# Patient Record
Sex: FEMALE | Race: WHITE | Employment: UNEMPLOYED | URBAN - METROPOLITAN AREA
[De-identification: names, ages, dates, MRNs, and addresses within clinical notes are randomized per-mention and may not be internally consistent; named-entity substitution may affect disease eponyms.]

---

## 2022-03-18 PROBLEM — O26.851 SPOTTING AFFECTING PREGNANCY IN FIRST TRIMESTER: Status: ACTIVE | Noted: 2020-08-26

## 2022-03-19 PROBLEM — R10.2 PELVIC PAIN IN ANTEPARTUM PERIOD IN THIRD TRIMESTER: Status: ACTIVE | Noted: 2021-02-21

## 2022-03-19 PROBLEM — O21.0 HYPEREMESIS AFFECTING PREGNANCY, ANTEPARTUM: Status: ACTIVE | Noted: 2020-08-26

## 2022-03-19 PROBLEM — O26.893 VAGINAL DISCHARGE DURING PREGNANCY IN THIRD TRIMESTER: Status: ACTIVE | Noted: 2021-02-21

## 2022-03-19 PROBLEM — O34.219 PREVIOUS CESAREAN SECTION COMPLICATING PREGNANCY: Status: ACTIVE | Noted: 2020-08-26

## 2022-03-19 PROBLEM — N89.8 VAGINAL DISCHARGE DURING PREGNANCY IN THIRD TRIMESTER: Status: ACTIVE | Noted: 2021-02-21

## 2023-07-31 PROBLEM — R89.9 ABNORMAL LABORATORY TEST: Status: ACTIVE | Noted: 2023-07-31

## 2023-08-03 ENCOUNTER — ROUTINE PRENATAL (OUTPATIENT)
Dept: OBGYN CLINIC | Age: 28
End: 2023-08-03
Payer: MEDICAID

## 2023-08-03 VITALS — BODY MASS INDEX: 29.44 KG/M2 | WEIGHT: 188 LBS | SYSTOLIC BLOOD PRESSURE: 112 MMHG | DIASTOLIC BLOOD PRESSURE: 70 MMHG

## 2023-08-03 DIAGNOSIS — O09.91 SUPERVISION OF HIGH RISK PREGNANCY IN FIRST TRIMESTER: ICD-10-CM

## 2023-08-03 DIAGNOSIS — F19.91 HISTORY OF DRUG USE: ICD-10-CM

## 2023-08-03 DIAGNOSIS — Z67.91 RH NEGATIVE STATUS DURING PREGNANCY IN FIRST TRIMESTER: ICD-10-CM

## 2023-08-03 DIAGNOSIS — Z36.3 ANTENATAL SCREENING FOR MALFORMATION USING ULTRASONICS: Primary | ICD-10-CM

## 2023-08-03 DIAGNOSIS — O34.219 PREVIOUS CESAREAN SECTION COMPLICATING PREGNANCY: ICD-10-CM

## 2023-08-03 DIAGNOSIS — Z11.3 SCREEN FOR STD (SEXUALLY TRANSMITTED DISEASE): ICD-10-CM

## 2023-08-03 DIAGNOSIS — Z11.51 SCREENING FOR HPV (HUMAN PAPILLOMAVIRUS): ICD-10-CM

## 2023-08-03 DIAGNOSIS — Z91.51 HISTORY OF SUICIDE ATTEMPT: ICD-10-CM

## 2023-08-03 DIAGNOSIS — O26.891 RH NEGATIVE STATUS DURING PREGNANCY IN FIRST TRIMESTER: ICD-10-CM

## 2023-08-03 DIAGNOSIS — Z12.4 PAP SMEAR FOR CERVICAL CANCER SCREENING: ICD-10-CM

## 2023-08-03 DIAGNOSIS — Z34.81 ENCOUNTER FOR SUPERVISION OF OTHER NORMAL PREGNANCY, FIRST TRIMESTER: ICD-10-CM

## 2023-08-03 DIAGNOSIS — Z14.1 CYSTIC FIBROSIS CARRIER: ICD-10-CM

## 2023-08-03 PROBLEM — N89.8 VAGINAL DISCHARGE DURING PREGNANCY IN THIRD TRIMESTER: Status: RESOLVED | Noted: 2021-02-21 | Resolved: 2023-08-03

## 2023-08-03 PROBLEM — O26.893 PELVIC PAIN IN ANTEPARTUM PERIOD IN THIRD TRIMESTER: Status: RESOLVED | Noted: 2021-02-21 | Resolved: 2023-08-03

## 2023-08-03 PROBLEM — R10.2 PELVIC PAIN IN ANTEPARTUM PERIOD IN THIRD TRIMESTER: Status: RESOLVED | Noted: 2021-02-21 | Resolved: 2023-08-03

## 2023-08-03 PROBLEM — O26.851 SPOTTING AFFECTING PREGNANCY IN FIRST TRIMESTER: Status: RESOLVED | Noted: 2020-08-26 | Resolved: 2023-08-03

## 2023-08-03 PROBLEM — O26.893 VAGINAL DISCHARGE DURING PREGNANCY IN THIRD TRIMESTER: Status: RESOLVED | Noted: 2021-02-21 | Resolved: 2023-08-03

## 2023-08-03 PROBLEM — O36.0990 RHESUS ISOIMMUNIZATION AFFECTING MANAGEMENT OF MOTHER, ANTEPARTUM CONDITION: Status: ACTIVE | Noted: 2023-07-31

## 2023-08-03 PROCEDURE — 76801 OB US < 14 WKS SINGLE FETUS: CPT | Performed by: OBSTETRICS & GYNECOLOGY

## 2023-08-03 PROCEDURE — 99204 OFFICE O/P NEW MOD 45 MIN: CPT | Performed by: OBSTETRICS & GYNECOLOGY

## 2023-08-03 RX ORDER — DIPHENHYDRAMINE HCL 50 MG
1 CAPSULE ORAL DAILY
Qty: 30 CAPSULE | Refills: 3 | Status: SHIPPED | OUTPATIENT
Start: 2023-08-03

## 2023-08-03 RX ORDER — ONDANSETRON 4 MG/1
4 TABLET, FILM COATED ORAL 3 TIMES DAILY PRN
Qty: 30 TABLET | Refills: 1 | Status: SHIPPED | OUTPATIENT
Start: 2023-08-03

## 2023-08-03 SDOH — ECONOMIC STABILITY: FOOD INSECURITY: WITHIN THE PAST 12 MONTHS, YOU WORRIED THAT YOUR FOOD WOULD RUN OUT BEFORE YOU GOT MONEY TO BUY MORE.: PATIENT DECLINED

## 2023-08-03 SDOH — ECONOMIC STABILITY: HOUSING INSECURITY
IN THE LAST 12 MONTHS, WAS THERE A TIME WHEN YOU DID NOT HAVE A STEADY PLACE TO SLEEP OR SLEPT IN A SHELTER (INCLUDING NOW)?: PATIENT REFUSED

## 2023-08-03 SDOH — ECONOMIC STABILITY: INCOME INSECURITY: HOW HARD IS IT FOR YOU TO PAY FOR THE VERY BASICS LIKE FOOD, HOUSING, MEDICAL CARE, AND HEATING?: PATIENT DECLINED

## 2023-08-03 SDOH — ECONOMIC STABILITY: FOOD INSECURITY: WITHIN THE PAST 12 MONTHS, THE FOOD YOU BOUGHT JUST DIDN'T LAST AND YOU DIDN'T HAVE MONEY TO GET MORE.: PATIENT DECLINED

## 2023-08-03 NOTE — ASSESSMENT & PLAN NOTE
+ anti-D  Did get Rhogam in ER on 7/15. + on 7/26 so likely secondary to Rhogam. Will need to repeat after 5 weeks to determine if still positive

## 2023-08-03 NOTE — PROGRESS NOTES
Smokeless tobacco: Never   Vaping Use    Vaping Use: Every day    Substances: Nicotine   Substance and Sexual Activity    Alcohol use: No    Drug use: Not Currently     Types: Marijuana Ilya Putty), Methamphetamines (Crystal Meth)    Sexual activity: Yes     Partners: Male     Birth control/protection: None           ROS:  Review of Systems   Constitutional: Negative for chills, fever and weight loss. HENT: Negative for hearing loss. Eyes: Negative for blurred vision and double vision. Respiratory: Negative for cough, hemoptysis and shortness of breath. Cardiovascular: Negative for chest pain, palpitations and orthopnea. Gastrointestinal: Negative for abdominal pain, blood in stool, constipation, diarrhea, nausea and vomiting. Genitourinary: Negative for dysuria, frequency, hematuria and urgency. Musculoskeletal: Negative for falls, joint pain and myalgias. Skin: Negative for itching and rash. Neurological: Negative for headaches. Endo/Heme/Allergies: Does not bruise/bleed easily. Psychiatric/Behavioral: Negative for depression and suicidal ideas. The patient is not nervous/anxious. All other systems reviewed and are negative. PHYSICAL EXAM:  Blood pressure 112/70, weight 188 lb (85.3 kg), last menstrual period 05/27/2023. Constitutional: She appears well-developed and well-nourished. No distress. HENT:    Head: Normocephalic and atraumatic. Cardiovascular: Normal rate, regular rhythm and normal heart sounds. Exam reveals no gallop and no friction rub. No murmur heard. Pulmonary/Chest: Effort normal and breath sounds normal. No respiratory distress. She has no wheezes. She has no rales. Abdominal: Soft. Bowel sounds are normal. There is no tenderness. There is no rebound and no guarding. Gravid. Skin: She is not diaphoretic. Psychiatric: She has a normal mood and affect.  Her behavior is normal. Thought content normal. .    Breasts:   Symmetric, no lesions, masses, rashes,

## 2023-08-08 DIAGNOSIS — O26.891 RH NEGATIVE STATUS DURING PREGNANCY IN FIRST TRIMESTER: ICD-10-CM

## 2023-08-08 DIAGNOSIS — Z67.91 RH NEGATIVE STATUS DURING PREGNANCY IN FIRST TRIMESTER: ICD-10-CM

## 2023-08-08 LAB
BLOOD GROUP ANTIBODIES SERPL: NORMAL
BLOOD GROUP ANTIBODIES SERPL: NORMAL

## 2023-08-24 ENCOUNTER — ROUTINE PRENATAL (OUTPATIENT)
Dept: OBGYN CLINIC | Age: 28
End: 2023-08-24

## 2023-08-24 VITALS — DIASTOLIC BLOOD PRESSURE: 60 MMHG | SYSTOLIC BLOOD PRESSURE: 103 MMHG

## 2023-08-24 DIAGNOSIS — O36.0990 RHESUS ISOIMMUNIZATION DURING PREGNANCY, ANTEPARTUM, SINGLE OR UNSPECIFIED FETUS: ICD-10-CM

## 2023-08-24 DIAGNOSIS — Z83.49 FH: CYSTIC FIBROSIS: ICD-10-CM

## 2023-08-24 DIAGNOSIS — Z3A.12 12 WEEKS GESTATION OF PREGNANCY: ICD-10-CM

## 2023-08-24 DIAGNOSIS — O21.0 HYPEREMESIS AFFECTING PREGNANCY, ANTEPARTUM: ICD-10-CM

## 2023-08-24 DIAGNOSIS — Z14.1 CYSTIC FIBROSIS CARRIER: ICD-10-CM

## 2023-08-24 DIAGNOSIS — O35.2XX0 PREVIOUS CHILD WITH CONGENITAL ANOMALY, CURRENTLY PREGNANT, ANTEPARTUM, SINGLE OR UNSPECIFIED FETUS: ICD-10-CM

## 2023-08-24 DIAGNOSIS — Z91.51 HISTORY OF SUICIDE ATTEMPT: ICD-10-CM

## 2023-08-24 DIAGNOSIS — O99.341 DEPRESSION COMPLICATING PREGNANCY IN FIRST TRIMESTER, ANTEPARTUM: ICD-10-CM

## 2023-08-24 DIAGNOSIS — F19.91 HISTORY OF DRUG USE: ICD-10-CM

## 2023-08-24 DIAGNOSIS — O34.219 PREVIOUS CESAREAN SECTION COMPLICATING PREGNANCY: ICD-10-CM

## 2023-08-24 DIAGNOSIS — Z36.82 NUCHAL TRANSLUCENCY OF FETUS ON PRENATAL ULTRASOUND: Primary | ICD-10-CM

## 2023-08-24 DIAGNOSIS — F32.A DEPRESSION COMPLICATING PREGNANCY IN FIRST TRIMESTER, ANTEPARTUM: ICD-10-CM

## 2023-08-24 DIAGNOSIS — O09.91 SUPERVISION OF HIGH RISK PREGNANCY IN FIRST TRIMESTER: ICD-10-CM

## 2023-08-24 NOTE — PROGRESS NOTES
MFM Consultation    Wisam Torres (: 1995) is a 25 y.o. [id number] at 12w5d with 3/2/2024, by Last Menstrual Period. Presents for evaluation of the following chief complaint(s):  Pregnancy Ultrasound (NT) and High Risk Pregnancy (Isoimmunization, CF carrier, Prev C/S)     Patient is full time [employer]. Scheduled to see Primary OB [facility]) on 23. No HAs, edema. Denies preeclamptic symptoms. No bleeding, LOF, cramping, ctxs, or vaginal pressure. History reviewed and updated as needed. Vag Bleeding in first tri, rhogam given. Ab screen now positive, likely as a result. Pt with significant psychosocial history. First child with CF. This and other pregnancies do not share that FOB. Pt states current partner is neg for CF. Mood reassuring     Review of Systems - per HPI; otherwise unremarkable. Exam:     Vitals:    23 1434   BP: 103/60     Recent Labs Reviewed. Please see formal ultrasound report under imaging tab. ASSESSMENT/PLAN:  Patient Active Problem List    Diagnosis Date Noted    Supervision of high risk pregnancy in first trimester 2023     Priority: High     Overview Note:     [facility] and [facility]   D=9 week US  Early screen ___  NIPT ___  23 [facility]: Normal NT and nasal bone. Genetic counseling done by MD.  Carrier screen neg except for CF carrier    F/U [facility] 20 weeks anatomy and echo         Assessment & Plan Note:     Low dose Aspirin  mg daily is recommended to be started at 12-16 weeks (some benefit seen with starting up to 28 weeks) for the prevention of preeclampsia  in high risk women. Consider stopping Aspirin at 36 weeks. Recommend Vitamin D 2000IU daily and Calcium 1000mg daily to aid in protection of bones and teeth. Recommend use of PNV daily with well-balanced diet. Unless instructed otherwise, recommend continuation of physical activity throughout pregnancy.        Genetic counseling was performed

## 2023-08-24 NOTE — ASSESSMENT & PLAN NOTE
Isoimmunization in pregnancy   o Discussed immunology of sensitization and how can affect fetus causing anemia.   o If Ab screen remains positive 12 weeks after rhogam shot; will need to monitor titer q month.

## 2023-08-24 NOTE — ASSESSMENT & PLAN NOTE
Low dose Aspirin  mg daily is recommended to be started at 12-16 weeks (some benefit seen with starting up to 28 weeks) for the prevention of preeclampsia  in high risk women. Consider stopping Aspirin at 36 weeks. Recommend Vitamin D 2000IU daily and Calcium 1000mg daily to aid in protection of bones and teeth. Recommend use of PNV daily with well-balanced diet. Unless instructed otherwise, recommend continuation of physical activity throughout pregnancy. Genetic counseling was performed by physician after reviewing patient's genetic history. The patient's Down syndrome age associated risk, as well as, risks of additional aneuploidy and genetic syndromes, are reduced by approximately 50% with a normal first trimester anatomy including, nuchal translucency and nasal bone. Ultrasound alone does not rule out all abnormalities of genetics and development. Maternal serum screening for aneuploidy was discussed with the patient including first trimester BRIAN-A/hCG, second trimester Quad screen (either in isolation or sequential with BRIAN-A) as well as non-invasive prenatal testing (NIPT) for aneuploidy from a maternal blood sample. Positive predictive and negative predictive values for these tests were explained, questions answered. Patient understands that these are screening tests that only assesses risk for select abnormalities (trisomies 15, 25, and 21, and sex chromosome abnormalities (NIPT), as well as markers for placental health (BRIAN-A) and risk for open neural tube defects (quad)). NIPT is designed for high risk populations, but should be considered by all patients who desire the current best option for screening for applicable genetic abnormalities. Limitations of technology discussed based on maternal age, technical aspects of tests, and maternal BMI reviewed. All questions answered and concerns discussed.      Patient elected to proceed with NIPT previously at [place] office- results

## 2023-08-27 PROBLEM — Z67.91 RH NEGATIVE STATUS DURING PREGNANCY IN FIRST TRIMESTER: Status: ACTIVE | Noted: 2020-08-26

## 2023-08-27 PROBLEM — O35.2XX0 PREVIOUS CHILD WITH CONGENITAL ANOMALY, CURRENTLY PREGNANT, ANTEPARTUM: Status: RESOLVED | Noted: 2023-08-24 | Resolved: 2023-08-27

## 2023-08-27 PROBLEM — O26.891 RH NEGATIVE STATUS DURING PREGNANCY IN FIRST TRIMESTER: Status: ACTIVE | Noted: 2020-08-26

## 2023-09-07 ENCOUNTER — ROUTINE PRENATAL (OUTPATIENT)
Dept: OBGYN CLINIC | Age: 28
End: 2023-09-07

## 2023-09-07 VITALS — WEIGHT: 186 LBS | BODY MASS INDEX: 29.13 KG/M2 | SYSTOLIC BLOOD PRESSURE: 114 MMHG | DIASTOLIC BLOOD PRESSURE: 68 MMHG

## 2023-09-07 DIAGNOSIS — Z14.1 CYSTIC FIBROSIS CARRIER: ICD-10-CM

## 2023-09-07 DIAGNOSIS — Z91.51 HISTORY OF SUICIDE ATTEMPT: ICD-10-CM

## 2023-09-07 DIAGNOSIS — Z83.49 FH: CYSTIC FIBROSIS: ICD-10-CM

## 2023-09-07 DIAGNOSIS — O09.91 SUPERVISION OF HIGH RISK PREGNANCY IN FIRST TRIMESTER: Primary | ICD-10-CM

## 2023-09-07 NOTE — PROGRESS NOTES
GLYNN. [id number].  14w5d   Denies LOF, VB, Ctxs. Good FM.       Vitals:    09/07/23 1358   BP: 114/68        Supervision of high risk pregnancy in first trimester  Anatomy scheduled with MFM       Katie Mcarthur DO

## 2023-09-29 ENCOUNTER — HOSPITAL ENCOUNTER (EMERGENCY)
Age: 28
Discharge: HOME | End: 2023-09-29
Attending: EMERGENCY MEDICINE
Payer: MEDICAID

## 2023-09-29 VITALS
DIASTOLIC BLOOD PRESSURE: 59 MMHG | SYSTOLIC BLOOD PRESSURE: 105 MMHG | TEMPERATURE: 98.1 F | RESPIRATION RATE: 17 BRPM | HEIGHT: 67 IN | HEART RATE: 74 BPM | BODY MASS INDEX: 28.25 KG/M2 | WEIGHT: 180 LBS | OXYGEN SATURATION: 97 %

## 2023-09-29 DIAGNOSIS — J06.9 VIRAL UPPER RESPIRATORY ILLNESS: Primary | ICD-10-CM

## 2023-09-29 LAB
FLUAV RNA SPEC QL NAA+PROBE: NOT DETECTED
FLUBV RNA SPEC QL NAA+PROBE: NOT DETECTED
SARS-COV-2 RDRP RESP QL NAA+PROBE: NOT DETECTED
SOURCE: NORMAL
STREP, MOLECULAR: NOT DETECTED

## 2023-09-29 PROCEDURE — 87502 INFLUENZA DNA AMP PROBE: CPT

## 2023-09-29 PROCEDURE — 87651 STREP A DNA AMP PROBE: CPT

## 2023-09-29 PROCEDURE — 87635 SARS-COV-2 COVID-19 AMP PRB: CPT

## 2023-09-29 PROCEDURE — 99283 EMERGENCY DEPT VISIT LOW MDM: CPT

## 2023-09-29 ASSESSMENT — ENCOUNTER SYMPTOMS
NAUSEA: 0
RHINORRHEA: 1
GASTROINTESTINAL NEGATIVE: 1
ALLERGIC/IMMUNOLOGIC NEGATIVE: 1
EYES NEGATIVE: 1
COUGH: 1
DIARRHEA: 0
VOMITING: 0
SORE THROAT: 1

## 2023-09-30 NOTE — ED PROVIDER NOTES
control/protection: None     Social Determinants of Health     Financial Resource Strain: Unknown (8/3/2023)    Overall Financial Resource Strain (CARDIA)     Difficulty of Paying Living Expenses: Patient refused   Food Insecurity: Unknown (8/3/2023)    Hunger Vital Sign     Worried About [place] in the Last Year: Patient refused     [address] in the Last Year: Patient refused   Transportation Needs: Unknown (8/3/2023)    PRAPARE - Transportation     Lack of Transportation (Non-Medical): Patient refused   Housing Stability: Unknown (8/3/2023)    Housing Stability Vital Sign     Unstable Housing in the Last Year: Patient refused        Previous Medications    DIPHENHYDRAMINE HCL, SLEEP, (UNISOM SLEEPGELS) 50 MG CAPS    Take 1 tablet by mouth daily    MAGNESIUM (MAGNESIUM-OXIDE) 250 MG TABS TABLET    Take 1 tablet by mouth daily    ONDANSETRON (ZOFRAN) 4 MG TABLET    Take 1 tablet by mouth 3 times daily as needed for Nausea or Vomiting    PRENATAL VIT-FE FUMARATE-FA (PRENATAL PO)    Take by mouth    VITAMIN B-6 (PYRIDOXINE) 50 MG TABLET    Take 1 tablet by mouth daily        Results for orders placed or performed during the hospital encounter of 09/29/23   COVID-19, Rapid    Specimen: Nasopharyngeal   Result Value Ref Range    Source NASAL SWAB      SARS-CoV-2, Rapid Not detected NOTD     Influenza A/B, Molecular    Specimen: Not Specified   Result Value Ref Range    Influenza A, NAV Not detected NOTD      Influenza B, NAV Not detected NOTD     Group A Strep Screen By PCR    Specimen: Throat   Result Value Ref Range    Strep, Molecular Not detected          No orders to display                     Voice dictation software was used during the making of this note. This software is not perfect and grammatical and other typographical errors may be present. This note has not been completely proofread for errors.      SUNITA Rucker  09/29/23 0213

## 2023-09-30 NOTE — DISCHARGE INSTRUCTIONS
The rapid COVID, rapid flu and rapid strep are negative. More than likely this is a viral upper respiratory tract infection. Especially with the exposure you had from your son. Make sure you are drinking plenty of fluids, rest, use over-the-counter Mucinex as directed, Tylenol as directed if needed for fever and return to the ED if worsening in any way. Follow-up with the OB for a recheck.

## 2023-09-30 NOTE — ED TRIAGE NOTES
Pt. A/ox4 and ambulatory to triage. Pt. States being 18weeks pregnant.  Pt. C/o fever, bodyaches, sore throat  and cough

## 2023-10-05 ENCOUNTER — ROUTINE PRENATAL (OUTPATIENT)
Dept: OBGYN CLINIC | Age: 28
End: 2023-10-05

## 2023-10-05 VITALS — WEIGHT: 189 LBS | BODY MASS INDEX: 29.6 KG/M2 | DIASTOLIC BLOOD PRESSURE: 70 MMHG | SYSTOLIC BLOOD PRESSURE: 118 MMHG

## 2023-10-05 DIAGNOSIS — Z91.51 HISTORY OF SUICIDE ATTEMPT: ICD-10-CM

## 2023-10-05 DIAGNOSIS — O09.91 SUPERVISION OF HIGH RISK PREGNANCY IN FIRST TRIMESTER: Primary | ICD-10-CM

## 2023-10-05 DIAGNOSIS — O36.0990 RHESUS ISOIMMUNIZATION DURING PREGNANCY, ANTEPARTUM, SINGLE OR UNSPECIFIED FETUS: ICD-10-CM

## 2023-10-05 DIAGNOSIS — Z83.49 FH: CYSTIC FIBROSIS: ICD-10-CM

## 2023-10-05 DIAGNOSIS — Z14.1 CYSTIC FIBROSIS CARRIER: ICD-10-CM

## 2023-10-05 NOTE — PROGRESS NOTES
GLYNN. [id number].  18w5d   Denies LOF, VB, Ctxs. Good FM.       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

## 2023-10-14 ENCOUNTER — HOSPITAL ENCOUNTER (OUTPATIENT)
Age: 28
Discharge: HOME | End: 2023-10-14
Attending: FAMILY MEDICINE | Admitting: OBSTETRICS & GYNECOLOGY
Payer: MEDICAID

## 2023-10-14 VITALS
RESPIRATION RATE: 16 BRPM | OXYGEN SATURATION: 97 % | SYSTOLIC BLOOD PRESSURE: 105 MMHG | HEART RATE: 71 BPM | TEMPERATURE: 98 F | DIASTOLIC BLOOD PRESSURE: 54 MMHG

## 2023-10-14 PROCEDURE — 99283 EMERGENCY DEPT VISIT LOW MDM: CPT

## 2023-10-14 PROCEDURE — 87086 URINE CULTURE/COLONY COUNT: CPT

## 2023-10-14 NOTE — PROGRESS NOTES
Pt to room [facility] 1 for triage with chief complaint of Pelvic pain. Assessment begins, EFM and [place] applied to a soft non tender abdomen and tracing well. Dr Jacinto Carney called to assess patient.

## 2023-10-14 NOTE — PROGRESS NOTES
Per Dr. America Stock cervix closed, culture sent for urine. Patient to have note for work Sunday October 15th,  patient cleared to be discharged home.

## 2023-10-14 NOTE — PROGRESS NOTES
Discharge instruction given. Information/teaching printout given to patient. States understanding. All questions answered. Informed pt to return to hospital for decreased fetal movement, if she suspects her water breaks, if vaginal bleeding occurs that is more than spotting, or she starts to experience painful regular contractions 4-5 minutes apart. Pt verbalizes understanding. Discussed importance of follow up with primary MD. Ambulate out to personal auto with Son at side. Pt stable at discharge.

## 2023-10-14 NOTE — PROGRESS NOTES
[facility]  [address]  [place] [address]  Phone: [phone]          October 14, 2023       Patient: Cindy Ríos        To Whom It May Concern:    Cindy Ríos was seen in [place] triage at [facility] on this above date. She may return to work on 10/16/23. Please reach out should you have any questions or concerns.       Sincerely,  Julien Guzmán RN

## 2023-10-15 LAB
BACTERIA SPEC CULT: NORMAL
SERVICE CMNT-IMP: NORMAL

## 2023-10-17 LAB
BACTERIA SPEC CULT: NORMAL
SERVICE CMNT-IMP: NORMAL

## 2023-10-23 ENCOUNTER — ROUTINE PRENATAL (OUTPATIENT)
Dept: OBGYN CLINIC | Age: 28
End: 2023-10-23
Payer: MEDICAID

## 2023-10-23 VITALS — DIASTOLIC BLOOD PRESSURE: 59 MMHG | SYSTOLIC BLOOD PRESSURE: 124 MMHG

## 2023-10-23 DIAGNOSIS — Z91.51 HISTORY OF SUICIDE ATTEMPT: ICD-10-CM

## 2023-10-23 DIAGNOSIS — Z3A.21 21 WEEKS GESTATION OF PREGNANCY: ICD-10-CM

## 2023-10-23 DIAGNOSIS — Z14.1 CYSTIC FIBROSIS CARRIER: ICD-10-CM

## 2023-10-23 DIAGNOSIS — O36.0990 RHESUS ISOIMMUNIZATION DURING PREGNANCY, ANTEPARTUM, SINGLE OR UNSPECIFIED FETUS: ICD-10-CM

## 2023-10-23 DIAGNOSIS — F19.91 HISTORY OF DRUG USE: ICD-10-CM

## 2023-10-23 DIAGNOSIS — O34.219 PREVIOUS CESAREAN SECTION COMPLICATING PREGNANCY: ICD-10-CM

## 2023-10-23 DIAGNOSIS — O99.342 DEPRESSION AFFECTING PREGNANCY IN SECOND TRIMESTER, ANTEPARTUM: ICD-10-CM

## 2023-10-23 DIAGNOSIS — Z83.49 FH: CYSTIC FIBROSIS: ICD-10-CM

## 2023-10-23 DIAGNOSIS — Z13.32 ENCOUNTER FOR SCREENING FOR MATERNAL DEPRESSION: ICD-10-CM

## 2023-10-23 DIAGNOSIS — F32.A DEPRESSION AFFECTING PREGNANCY IN SECOND TRIMESTER, ANTEPARTUM: ICD-10-CM

## 2023-10-23 DIAGNOSIS — O09.92 SUPERVISION OF HIGH RISK PREGNANCY IN SECOND TRIMESTER: Primary | ICD-10-CM

## 2023-10-23 DIAGNOSIS — O44.20 MARGINAL PLACENTA PREVIA: ICD-10-CM

## 2023-10-23 PROBLEM — O26.892 RH NEGATIVE STATUS DURING PREGNANCY IN SECOND TRIMESTER: Status: ACTIVE | Noted: 2020-08-26

## 2023-10-23 PROCEDURE — 99214 OFFICE O/P EST MOD 30 MIN: CPT | Performed by: OBSTETRICS & GYNECOLOGY

## 2023-10-23 PROCEDURE — 76825 ECHO EXAM OF FETAL HEART: CPT | Performed by: OBSTETRICS & GYNECOLOGY

## 2023-10-23 PROCEDURE — 76811 OB US DETAILED SNGL FETUS: CPT | Performed by: OBSTETRICS & GYNECOLOGY

## 2023-10-23 PROCEDURE — 93325 DOPPLER ECHO COLOR FLOW MAPG: CPT | Performed by: OBSTETRICS & GYNECOLOGY

## 2023-10-23 PROCEDURE — 96127 BRIEF EMOTIONAL/BEHAV ASSMT: CPT | Performed by: OBSTETRICS & GYNECOLOGY

## 2023-10-23 ASSESSMENT — PATIENT HEALTH QUESTIONNAIRE - PHQ9
1. LITTLE INTEREST OR PLEASURE IN DOING THINGS: 0
2. FEELING DOWN, DEPRESSED OR HOPELESS: 0
SUM OF ALL RESPONSES TO PHQ QUESTIONS 1-9: 0
SUM OF ALL RESPONSES TO PHQ QUESTIONS 1-9: 0
SUM OF ALL RESPONSES TO PHQ9 QUESTIONS 1 & 2: 0
SUM OF ALL RESPONSES TO PHQ QUESTIONS 1-9: 0
SUM OF ALL RESPONSES TO PHQ QUESTIONS 1-9: 0

## 2023-10-23 NOTE — PROGRESS NOTES
today    Exam:     Vitals:    10/23/23 1418   BP: (!) 398/68      Applicable labs reviewed. Please see formal ultrasound report under imaging tab. ASSESSMENT/PLAN:  Patient Active Problem List    Diagnosis Date Noted    Marginal placenta previa 10/23/2023     Priority: High     Overview Note:     10/23/2023 [facility], likely to resolve. Supervision of high risk pregnancy in second trimester 08/03/2023     Priority: High     Overview Note:     Pap 4/2023 in [place] result viewed and normal   D=9 week US  Early screen ___  NIPT ___  08/24/23 [facility]: Normal NT and nasal bone. Genetic counseling done by MD.  Carrier screen neg except for CF carrier  -> pt reports low risk NIPT. Not visible in Epic chart. 10/23/20[facility] at [facility]: Normal anatomy/echo, MONICA WNL, Dopplers WNL. 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 + on 7/26 so likely secondary to Rhogam.   Will need to repeat 12 weeks after that to determine if still positive (11/23)      Rh negative status during pregnancy in second trimester 08/26/2020     Priority: Medium    Depression affecting pregnancy in second trimester, antepartum 08/26/2020     Priority: Medium     Overview Note:     08/24/23 [facility]: Stable mood now that she is feeling better from N/V      FH: cystic fibrosis 08/24/2023     Priority: Low     Overview Note:     08/24/23 [facility]: 1st child with CF. Doing with on Tricefta      History of suicide attempt 08/03/2023     Priority: Low     Overview Note:     Currently moods table.  No meds  [facility] IMPACTT info given       History of drug use 08/03/2023     Priority: Low     Overview Note:     UDS negative

## 2023-10-23 NOTE — PATIENT INSTRUCTIONS
If you have pelvic or back pain and discomfort that is NOT cramping or contractions, you can consider these things to help. Maternity support belt/K-tape (online instructional videos at [email or web address].[email or web address])  Heat/Cold (whichever soothes more)  Massage  Tylenol/biofreeze/similar  Pelvic Floor PT   Aquatic therapy   Chiropractic care  TENS unit can be considered.

## 2023-11-02 ENCOUNTER — ROUTINE PRENATAL (OUTPATIENT)
Dept: OBGYN CLINIC | Age: 28
End: 2023-11-02

## 2023-11-02 VITALS — WEIGHT: 190 LBS | BODY MASS INDEX: 29.76 KG/M2 | SYSTOLIC BLOOD PRESSURE: 118 MMHG | DIASTOLIC BLOOD PRESSURE: 74 MMHG

## 2023-11-02 DIAGNOSIS — Z14.1 CYSTIC FIBROSIS CARRIER: ICD-10-CM

## 2023-11-02 DIAGNOSIS — Z13.0 SCREENING FOR IRON DEFICIENCY ANEMIA: ICD-10-CM

## 2023-11-02 DIAGNOSIS — Z13.1 SCREENING FOR DIABETES MELLITUS (DM): ICD-10-CM

## 2023-11-02 DIAGNOSIS — Z67.91 RH NEGATIVE STATUS DURING PREGNANCY IN SECOND TRIMESTER: ICD-10-CM

## 2023-11-02 DIAGNOSIS — O99.342 DEPRESSION AFFECTING PREGNANCY IN SECOND TRIMESTER, ANTEPARTUM: ICD-10-CM

## 2023-11-02 DIAGNOSIS — Z91.51 HISTORY OF SUICIDE ATTEMPT: ICD-10-CM

## 2023-11-02 DIAGNOSIS — O44.20 MARGINAL PLACENTA PREVIA: ICD-10-CM

## 2023-11-02 DIAGNOSIS — F32.A DEPRESSION AFFECTING PREGNANCY IN SECOND TRIMESTER, ANTEPARTUM: ICD-10-CM

## 2023-11-02 DIAGNOSIS — Z83.49 FH: CYSTIC FIBROSIS: ICD-10-CM

## 2023-11-02 DIAGNOSIS — O09.92 SUPERVISION OF HIGH RISK PREGNANCY IN SECOND TRIMESTER: Primary | ICD-10-CM

## 2023-11-02 DIAGNOSIS — O26.892 RH NEGATIVE STATUS DURING PREGNANCY IN SECOND TRIMESTER: ICD-10-CM

## 2023-11-02 LAB
ERYTHROCYTE [DISTWIDTH] IN BLOOD BY AUTOMATED COUNT: 13.2 % (ref 11.9–14.6)
FERRITIN SERPL-MCNC: 5 NG/ML (ref 8–388)
HCT VFR BLD AUTO: 36.6 % (ref 35.8–46.3)
HGB BLD-MCNC: 12 G/DL (ref 11.7–15.4)
MCH RBC QN AUTO: 30.7 PG (ref 26.1–32.9)
MCHC RBC AUTO-ENTMCNC: 32.8 G/DL (ref 31.4–35)
MCV RBC AUTO: 93.6 FL (ref 82–102)
NRBC # BLD: 0 K/UL (ref 0–0.2)
PLATELET # BLD AUTO: 248 K/UL (ref 150–450)
PMV BLD AUTO: 10.9 FL (ref 9.4–12.3)
RBC # BLD AUTO: 3.91 M/UL (ref 4.05–5.2)
WBC # BLD AUTO: 10 K/UL (ref 4.3–11.1)

## 2023-11-02 RX ORDER — FAMOTIDINE 20 MG/1
20 TABLET, FILM COATED ORAL
Qty: 60 TABLET | Refills: 3 | Status: SHIPPED | OUTPATIENT
Start: 2023-11-02

## 2023-11-02 NOTE — PROGRESS NOTES
GLYNN. [id number].  22w5d   Denies LOF, VB, Ctxs. Good FM. Reports she is pale and weak  Taking gummy PNV  Also heart burn    Vitals:    11/02/23 1502   BP: 118/74        Supervision of high risk pregnancy in second trimester  FU growth scheduled with MFM  GTT at [facility]    Check CBC today. Will likely need to add on iron   Pepcid sent in for GERD. Lifestyle modifications dicussed.      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 + on 7/26 so likely secondary to Rhogam.   Will need to repeat 12 weeks after that to determine if still positive (11/23)    Marginal placenta previa   Pelvic rest until repeat US     Orders Placed This Encounter   Medications    famotidine (PEPCID) 20 MG tablet     Sig: Take 1 tablet by mouth nightly     Dispense:  60 tablet     Refill:  3        Orders Placed This Encounter   Procedures    CBC     Standing Status:   Future     Standing Expiration Date:   11/2/2024    Ferritin     Standing Status:   Future     Standing Expiration Date:   11/2/2024        Katie Mcarthur DO

## 2023-11-02 NOTE — ASSESSMENT & PLAN NOTE
+ anti-D  Did get Rhogam in ER on 7/15. + on 7/26 so likely secondary to Rhogam.   Will need to repeat 12 weeks after that to determine if still positive (11/23)

## 2023-11-03 DIAGNOSIS — Z13.1 SCREENING FOR DIABETES MELLITUS (DM): ICD-10-CM

## 2023-11-03 DIAGNOSIS — O26.899 RH NEGATIVE STATE IN ANTEPARTUM PERIOD: ICD-10-CM

## 2023-11-03 DIAGNOSIS — Z13.0 SCREENING FOR IRON DEFICIENCY ANEMIA: Primary | ICD-10-CM

## 2023-11-03 DIAGNOSIS — Z67.91 RH NEGATIVE STATE IN ANTEPARTUM PERIOD: ICD-10-CM

## 2023-11-15 ENCOUNTER — PATIENT MESSAGE (OUTPATIENT)
Dept: OBGYN CLINIC | Age: 28
End: 2023-11-15

## 2023-11-15 NOTE — TELEPHONE ENCOUNTER
From: Mabel Tracy  To: Dr. Sid Traore: 11/15/2023 12:16 PM EST  Subject: Pregnancy test and due date    Could you please send me a email verifying my pregnancy status and my due Date, for wic purpose.  As soon as possible please and thank you for your time

## 2023-12-11 ENCOUNTER — ROUTINE PRENATAL (OUTPATIENT)
Dept: OBGYN CLINIC | Age: 28
End: 2023-12-11
Payer: MEDICAID

## 2023-12-11 VITALS — SYSTOLIC BLOOD PRESSURE: 115 MMHG | DIASTOLIC BLOOD PRESSURE: 62 MMHG

## 2023-12-11 DIAGNOSIS — Z3A.28 28 WEEKS GESTATION OF PREGNANCY: ICD-10-CM

## 2023-12-11 DIAGNOSIS — O44.20 MARGINAL PLACENTA PREVIA: ICD-10-CM

## 2023-12-11 DIAGNOSIS — O36.0990 RHESUS ISOIMMUNIZATION DURING PREGNANCY, ANTEPARTUM, SINGLE OR UNSPECIFIED FETUS: ICD-10-CM

## 2023-12-11 DIAGNOSIS — F19.91 HISTORY OF DRUG USE: ICD-10-CM

## 2023-12-11 DIAGNOSIS — O99.343 DEPRESSION AFFECTING PREGNANCY IN THIRD TRIMESTER, ANTEPARTUM: ICD-10-CM

## 2023-12-11 DIAGNOSIS — O34.219 PREVIOUS CESAREAN SECTION COMPLICATING PREGNANCY: ICD-10-CM

## 2023-12-11 DIAGNOSIS — Z14.1 CYSTIC FIBROSIS CARRIER: ICD-10-CM

## 2023-12-11 DIAGNOSIS — O26.893 RH NEGATIVE STATUS DURING PREGNANCY IN THIRD TRIMESTER: ICD-10-CM

## 2023-12-11 DIAGNOSIS — O09.93 SUPERVISION OF HIGH RISK PREGNANCY IN THIRD TRIMESTER: Primary | ICD-10-CM

## 2023-12-11 DIAGNOSIS — Z67.91 RH NEGATIVE STATUS DURING PREGNANCY IN THIRD TRIMESTER: ICD-10-CM

## 2023-12-11 DIAGNOSIS — Z83.49 FH: CYSTIC FIBROSIS: ICD-10-CM

## 2023-12-11 DIAGNOSIS — F32.A DEPRESSION AFFECTING PREGNANCY IN THIRD TRIMESTER, ANTEPARTUM: ICD-10-CM

## 2023-12-11 DIAGNOSIS — Z91.51 HISTORY OF SUICIDE ATTEMPT: ICD-10-CM

## 2023-12-11 PROCEDURE — 76816 OB US FOLLOW-UP PER FETUS: CPT | Performed by: OBSTETRICS & GYNECOLOGY

## 2023-12-11 PROCEDURE — 99214 OFFICE O/P EST MOD 30 MIN: CPT | Performed by: OBSTETRICS & GYNECOLOGY

## 2023-12-11 NOTE — PROGRESS NOTES
[address] [place] [id number]    [address] [place] [address], [address] [place] [address]  p- [phone]  [phone]       [facility] Follow-up Visit  Abdirashid Melgoza (1995) is a 25 y.o. [id number] at 28w2d with 3/2/2024, by Last Menstrual Period. Presents for evaluation of the following chief complaint(s):   Chief Complaint   Patient presents with    Pregnancy Ultrasound    High Risk Pregnancy     Isoimmunization, CF carrier, Prev C/S         Patient is full time [employer]. Scheduled to see Primary OB [facility]) on 12/14/23. No HAs, edema. Denies preeclamptic symptoms. No bleeding, LOF, cramping, ctxs, or vaginal pressure. Reports good fetal movement. Vag Bleeding in first tri, rhogam given. Ab screen now positive, likely as a result. --> plan recheck early November. Pt with significant psychosocial history. --> mood reasuring  First child with CF. This and other pregnancies do not share that FOB. Pt states current partner is neg for CF. Mood evaluated today based on discussion with pt and PHQ screen. 10/23/2023    11:58 AM   PHQ-9    Little interest or pleasure in doing things 0   Feeling down, depressed, or hopeless 0   PHQ-2 Score 0   PHQ-9 Total Score 0      Mood Reassuring today  Recommend lifestyle/behavioral modifications to enhance mood (exercise, sunshine, mood apps, nutritional modifications, etc). Interval history since prior appt reviewed and updated as indicated. Addressed normal pregnancy complaints, reassured and offered suggestions for care  Reviewed gestational age precautions and activity goals/limitations  Nutritional counseling as well as specific goals based on current maternal and fetal status  Options for GERD, constipation, other common complaints reviewed.    Reviewed gestational age appropriate preventive care regarding communicable disease transmission and vaccines as appropriate (including flu, TDaP >28wk, RSV 32-36wk, and COVID.)  Mood counseling today    Exam:

## 2023-12-14 ENCOUNTER — ROUTINE PRENATAL (OUTPATIENT)
Dept: OBGYN CLINIC | Age: 28
End: 2023-12-14
Payer: MEDICAID

## 2023-12-14 VITALS — SYSTOLIC BLOOD PRESSURE: 118 MMHG | DIASTOLIC BLOOD PRESSURE: 76 MMHG

## 2023-12-14 DIAGNOSIS — F32.A DEPRESSION AFFECTING PREGNANCY IN THIRD TRIMESTER, ANTEPARTUM: ICD-10-CM

## 2023-12-14 DIAGNOSIS — Z13.1 SCREENING FOR DIABETES MELLITUS (DM): ICD-10-CM

## 2023-12-14 DIAGNOSIS — O26.899 RH NEGATIVE STATE IN ANTEPARTUM PERIOD: ICD-10-CM

## 2023-12-14 DIAGNOSIS — Z67.91 RH NEGATIVE STATE IN ANTEPARTUM PERIOD: ICD-10-CM

## 2023-12-14 DIAGNOSIS — O26.893 RH NEGATIVE STATUS DURING PREGNANCY IN THIRD TRIMESTER: ICD-10-CM

## 2023-12-14 DIAGNOSIS — Z13.0 SCREENING FOR IRON DEFICIENCY ANEMIA: ICD-10-CM

## 2023-12-14 DIAGNOSIS — Z91.51 HISTORY OF SUICIDE ATTEMPT: ICD-10-CM

## 2023-12-14 DIAGNOSIS — Z83.49 FH: CYSTIC FIBROSIS: ICD-10-CM

## 2023-12-14 DIAGNOSIS — Z14.1 CYSTIC FIBROSIS CARRIER: ICD-10-CM

## 2023-12-14 DIAGNOSIS — Z67.91 RH NEGATIVE STATUS DURING PREGNANCY IN THIRD TRIMESTER: ICD-10-CM

## 2023-12-14 DIAGNOSIS — O09.93 SUPERVISION OF HIGH RISK PREGNANCY IN THIRD TRIMESTER: Primary | ICD-10-CM

## 2023-12-14 DIAGNOSIS — O99.343 DEPRESSION AFFECTING PREGNANCY IN THIRD TRIMESTER, ANTEPARTUM: ICD-10-CM

## 2023-12-14 LAB
BLOOD GROUP ANTIBODIES SERPL: NORMAL
ERYTHROCYTE [DISTWIDTH] IN BLOOD BY AUTOMATED COUNT: 13 % (ref 11.9–14.6)
HCT VFR BLD AUTO: 32.8 % (ref 35.8–46.3)
HGB BLD-MCNC: 10.9 G/DL (ref 11.7–15.4)
MCH RBC QN AUTO: 30.7 PG (ref 26.1–32.9)
MCHC RBC AUTO-ENTMCNC: 33.2 G/DL (ref 31.4–35)
MCV RBC AUTO: 92.4 FL (ref 82–102)
NRBC # BLD: 0 K/UL (ref 0–0.2)
PLATELET # BLD AUTO: 250 K/UL (ref 150–450)
PMV BLD AUTO: 10.2 FL (ref 9.4–12.3)
RBC # BLD AUTO: 3.55 M/UL (ref 4.05–5.2)
WBC # BLD AUTO: 6.8 K/UL (ref 4.3–11.1)

## 2023-12-14 PROCEDURE — 99213 OFFICE O/P EST LOW 20 MIN: CPT | Performed by: OBSTETRICS & GYNECOLOGY

## 2023-12-14 RX ORDER — OMEPRAZOLE 40 MG/1
40 CAPSULE, DELAYED RELEASE ORAL
Qty: 90 CAPSULE | Refills: 1 | Status: SHIPPED | OUTPATIENT
Start: 2023-12-14

## 2023-12-14 NOTE — PROGRESS NOTES
GLYNN. [id number].  28w5d   Denies LOF, VB, Ctxs. Good FM. 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 + on 7/26 so likely secondary to Rhogam.   Will need to repeat 12 weeks after that to determine if still positive (11/23). Repeat ABSC today.  Will wait to give Rhogam until results are back**    Marginal placenta previa RESOLVED on last US with MFM    Orders Placed This Encounter   Medications    omeprazole (PRILOSEC) 40 MG delayed release capsule     Sig: Take 1 capsule by mouth every morning (before breakfast)     Dispense:  90 capsule     Refill:  1          Katie Mcarthur DO

## 2023-12-15 LAB — GLUCOSE 1 HOUR: 91 MG/DL

## 2024-01-04 ENCOUNTER — ROUTINE PRENATAL (OUTPATIENT)
Dept: OBGYN CLINIC | Age: 29
End: 2024-01-04
Payer: MEDICAID

## 2024-01-04 VITALS — DIASTOLIC BLOOD PRESSURE: 86 MMHG | SYSTOLIC BLOOD PRESSURE: 132 MMHG | WEIGHT: 202 LBS | BODY MASS INDEX: 31.64 KG/M2

## 2024-01-04 DIAGNOSIS — F32.A DEPRESSION AFFECTING PREGNANCY IN THIRD TRIMESTER, ANTEPARTUM: ICD-10-CM

## 2024-01-04 DIAGNOSIS — O09.93 SUPERVISION OF HIGH RISK PREGNANCY IN THIRD TRIMESTER: Primary | ICD-10-CM

## 2024-01-04 DIAGNOSIS — Z14.1 CYSTIC FIBROSIS CARRIER: ICD-10-CM

## 2024-01-04 DIAGNOSIS — Z91.51 HISTORY OF SUICIDE ATTEMPT: ICD-10-CM

## 2024-01-04 DIAGNOSIS — O26.893 RH NEGATIVE STATUS DURING PREGNANCY IN THIRD TRIMESTER: ICD-10-CM

## 2024-01-04 DIAGNOSIS — Z67.91 RH NEGATIVE STATUS DURING PREGNANCY IN THIRD TRIMESTER: ICD-10-CM

## 2024-01-04 DIAGNOSIS — Z83.49 FH: CYSTIC FIBROSIS: ICD-10-CM

## 2024-01-04 DIAGNOSIS — O99.343 DEPRESSION AFFECTING PREGNANCY IN THIRD TRIMESTER, ANTEPARTUM: ICD-10-CM

## 2024-01-04 PROCEDURE — 99213 OFFICE O/P EST LOW 20 MIN: CPT | Performed by: OBSTETRICS & GYNECOLOGY

## 2024-01-04 RX ORDER — PSEUDOEPHEDRINE HCL 30 MG
30 TABLET ORAL DAILY
COMMUNITY

## 2024-01-04 RX ORDER — FERROUS SULFATE 325(65) MG
325 TABLET ORAL 2 TIMES DAILY
COMMUNITY

## 2024-01-04 RX ORDER — ASCORBIC ACID 500 MG
1000 TABLET ORAL DAILY
COMMUNITY

## 2024-01-04 ASSESSMENT — PATIENT HEALTH QUESTIONNAIRE - PHQ9
SUM OF ALL RESPONSES TO PHQ QUESTIONS 1-9: 0
2. FEELING DOWN, DEPRESSED OR HOPELESS: 0
SUM OF ALL RESPONSES TO PHQ QUESTIONS 1-9: 0
1. LITTLE INTEREST OR PLEASURE IN DOING THINGS: 0
SUM OF ALL RESPONSES TO PHQ QUESTIONS 1-9: 0
SUM OF ALL RESPONSES TO PHQ9 QUESTIONS 1 & 2: 0
SUM OF ALL RESPONSES TO PHQ QUESTIONS 1-9: 0

## 2024-01-04 NOTE — PROGRESS NOTES
GLYNN. .  31w5d   Denies LOF, VB, Ctxs.  Good FM.      Vitals:    24 1505   BP: 132/86        Rh negative status during pregnancy in third trimester  S/p Rhogam . Not documented in chart. Patient reports she received and it is documented that patient checked in for that appointment  We are looking into why this was not documented and will correct once confirmed MA who gave injection      Supervision of high risk pregnancy in third trimester  Kick counts and labor precautions reviewed    Schedule RCS at [facility]   Fu growth with MFM scheduled        Katie Mcarthur DO

## 2024-01-04 NOTE — ASSESSMENT & PLAN NOTE
S/p Rhogam 12/19. Not documented in chart. Patient reports she received and it is documented that patient checked in for that appointment  We are looking into why this was not documented and will correct once confirmed MA who gave injection

## 2024-01-08 ENCOUNTER — ROUTINE PRENATAL (OUTPATIENT)
Dept: OBGYN CLINIC | Age: 29
End: 2024-01-08
Payer: MEDICAID

## 2024-01-08 VITALS — DIASTOLIC BLOOD PRESSURE: 76 MMHG | SYSTOLIC BLOOD PRESSURE: 118 MMHG

## 2024-01-08 DIAGNOSIS — O99.013 ANEMIA AFFECTING PREGNANCY IN THIRD TRIMESTER: ICD-10-CM

## 2024-01-08 DIAGNOSIS — Z83.49 FH: CYSTIC FIBROSIS: ICD-10-CM

## 2024-01-08 DIAGNOSIS — Z14.1 CYSTIC FIBROSIS CARRIER: ICD-10-CM

## 2024-01-08 DIAGNOSIS — Z91.51 HISTORY OF SUICIDE ATTEMPT: ICD-10-CM

## 2024-01-08 DIAGNOSIS — O99.343 DEPRESSION AFFECTING PREGNANCY IN THIRD TRIMESTER, ANTEPARTUM: ICD-10-CM

## 2024-01-08 DIAGNOSIS — Z3A.32 32 WEEKS GESTATION OF PREGNANCY: ICD-10-CM

## 2024-01-08 DIAGNOSIS — O36.0990 RHESUS ISOIMMUNIZATION DURING PREGNANCY, ANTEPARTUM, SINGLE OR UNSPECIFIED FETUS: ICD-10-CM

## 2024-01-08 DIAGNOSIS — F32.A DEPRESSION AFFECTING PREGNANCY IN THIRD TRIMESTER, ANTEPARTUM: ICD-10-CM

## 2024-01-08 DIAGNOSIS — Z67.91 RH NEGATIVE STATUS DURING PREGNANCY IN THIRD TRIMESTER: ICD-10-CM

## 2024-01-08 DIAGNOSIS — O09.93 SUPERVISION OF HIGH RISK PREGNANCY IN THIRD TRIMESTER: Primary | ICD-10-CM

## 2024-01-08 DIAGNOSIS — O26.893 RH NEGATIVE STATUS DURING PREGNANCY IN THIRD TRIMESTER: ICD-10-CM

## 2024-01-08 PROCEDURE — 76816 OB US FOLLOW-UP PER FETUS: CPT | Performed by: OBSTETRICS & GYNECOLOGY

## 2024-01-08 PROCEDURE — 99214 OFFICE O/P EST MOD 30 MIN: CPT | Performed by: OBSTETRICS & GYNECOLOGY

## 2024-01-08 PROCEDURE — 76819 FETAL BIOPHYS PROFIL W/O NST: CPT | Performed by: OBSTETRICS & GYNECOLOGY

## 2024-01-08 ASSESSMENT — PATIENT HEALTH QUESTIONNAIRE - PHQ9
SUM OF ALL RESPONSES TO PHQ9 QUESTIONS 1 & 2: 0
SUM OF ALL RESPONSES TO PHQ QUESTIONS 1-9: 0
SUM OF ALL RESPONSES TO PHQ QUESTIONS 1-9: 0
1. LITTLE INTEREST OR PLEASURE IN DOING THINGS: 0
SUM OF ALL RESPONSES TO PHQ QUESTIONS 1-9: 0
SUM OF ALL RESPONSES TO PHQ QUESTIONS 1-9: 0
2. FEELING DOWN, DEPRESSED OR HOPELESS: 0

## 2024-01-08 NOTE — ASSESSMENT & PLAN NOTE
Anemia in pregnancy can be defined as follows, based mostly on data in nonpregnant individuals-   First trimester - Hemoglobin <11 g/dL   Second trimester - Hemoglobin <10.5 g/dL   Third trimester - Hemoglobin <11 g/dL   Postpartum - Hemoglobin <10 g/dL     Anemia in pregnant and postpartum individuals correlates with negative  outcomes.     Throughout pregnancy, iron deficiency anemia adversely affects the maternal and fetal well-being, and is linked to increased morbidity and fetal death. Affected mothers frequently experience breathing difficulties, fainting, tiredness, palpitations, and sleep difficulties. Complaints of pica are common. Iron deficiency during the first trimester, has a more negative impact on fetal growth than anemia developing later in pregnancy. This is also true for risk of premature labor. Later in pregnancy there is increased risk of developing  infection, preeclampsia, and severe postpartum hemorrhage. Adverse  outcomes include premature labor, intrauterine growth retardation, low birth weight, birth asphyxia, and  anemia. Postpartum anemia is linked with depression, emotional instability, feelings of poor quality of life. Additionally, it also may reduce quality/quantity of breast milk.     Lowered iron stores maternal iron stores result in lower fetal/ iron stores. This deficit may persist for up to one year and result in iron deficiency anemia. Such a state should be identified and treated promptly because of the possible long term consequences. Iron is essential for neural metabolism and functioning. Iron deficiency anemia results in changes in energy metabolism within the brain with defects in neurotransmitter function and myelination. Therefore, infants and young children with iron deficiency anemia are at risk of developmental difficulties involving cognitive, social-emotional, and adaptive functions. Other studies have documented delays in

## 2024-01-08 NOTE — PROGRESS NOTES
day of the visit 01/08/2024. Ultrasound findings were discussed separately and are not included in this time calculation.

## 2024-01-08 NOTE — PATIENT INSTRUCTIONS
Resources for Depression/Anxiety  Postpartum Support International (PSI).    PSI Warmline:  9-097-599-4PPD (5921).  WWW.POSTPARTUM.NET    Mom's IMPACTT  [email or web address]       In order to optimize maternal, fetal, and  health, we recommend the following vaccinations.   Flu- yearly (https://www.highriskpregnancyinfo.org/flu-facts-for-pregnancy)  Consider Covid vaccination/booster (https://www.highriskpregnancyinfo.org/covid-19-pregnancy)  TDaP after 28 weeks each pregnancy (https://www.highriskpregnancyinfo.org/tdap)  Consider RSV vaccine 32-36 weeks of pregnancy.  (https://www.highriskpregnancyinfo.org/rsv)

## 2024-01-17 ENCOUNTER — ROUTINE PRENATAL (OUTPATIENT)
Dept: OBGYN CLINIC | Age: 29
End: 2024-01-17
Payer: MEDICAID

## 2024-01-17 VITALS — DIASTOLIC BLOOD PRESSURE: 84 MMHG | SYSTOLIC BLOOD PRESSURE: 124 MMHG | WEIGHT: 204 LBS | BODY MASS INDEX: 31.95 KG/M2

## 2024-01-17 DIAGNOSIS — Z83.49 FH: CYSTIC FIBROSIS: ICD-10-CM

## 2024-01-17 DIAGNOSIS — Z14.1 CYSTIC FIBROSIS CARRIER: ICD-10-CM

## 2024-01-17 DIAGNOSIS — O99.343 DEPRESSION AFFECTING PREGNANCY IN THIRD TRIMESTER, ANTEPARTUM: ICD-10-CM

## 2024-01-17 DIAGNOSIS — O99.013 ANEMIA AFFECTING PREGNANCY IN THIRD TRIMESTER: ICD-10-CM

## 2024-01-17 DIAGNOSIS — Z91.51 HISTORY OF SUICIDE ATTEMPT: ICD-10-CM

## 2024-01-17 DIAGNOSIS — O09.93 SUPERVISION OF HIGH RISK PREGNANCY IN THIRD TRIMESTER: Primary | ICD-10-CM

## 2024-01-17 DIAGNOSIS — F32.A DEPRESSION AFFECTING PREGNANCY IN THIRD TRIMESTER, ANTEPARTUM: ICD-10-CM

## 2024-01-17 DIAGNOSIS — O26.893 RH NEGATIVE STATUS DURING PREGNANCY IN THIRD TRIMESTER: ICD-10-CM

## 2024-01-17 DIAGNOSIS — Z67.91 RH NEGATIVE STATUS DURING PREGNANCY IN THIRD TRIMESTER: ICD-10-CM

## 2024-01-17 LAB
ERYTHROCYTE [DISTWIDTH] IN BLOOD BY AUTOMATED COUNT: 12.6 % (ref 11.9–14.6)
HCT VFR BLD AUTO: 32.4 % (ref 35.8–46.3)
HGB BLD-MCNC: 10.6 G/DL (ref 11.7–15.4)
MCH RBC QN AUTO: 29 PG (ref 26.1–32.9)
MCHC RBC AUTO-ENTMCNC: 32.7 G/DL (ref 31.4–35)
MCV RBC AUTO: 88.5 FL (ref 82–102)
NRBC # BLD: 0 K/UL (ref 0–0.2)
PLATELET # BLD AUTO: 230 K/UL (ref 150–450)
PMV BLD AUTO: 10.7 FL (ref 9.4–12.3)
RBC # BLD AUTO: 3.66 M/UL (ref 4.05–5.2)
WBC # BLD AUTO: 9.7 K/UL (ref 4.3–11.1)

## 2024-01-17 PROCEDURE — 99213 OFFICE O/P EST LOW 20 MIN: CPT | Performed by: OBSTETRICS & GYNECOLOGY

## 2024-01-17 RX ORDER — DOCUSATE SODIUM 100 MG/1
100 CAPSULE, LIQUID FILLED ORAL 2 TIMES DAILY
Qty: 60 CAPSULE | Refills: 2 | Status: SHIPPED | OUTPATIENT
Start: 2024-01-17

## 2024-01-17 NOTE — PROGRESS NOTES
GLYNN. .  33w4d   Denies LOF, VB, Ctxs.  Good FM.      Vitals:    24 1438   BP: 124/84        Supervision of high risk pregnancy in third trimester  Growth appropriate with MFM  S/p Rhogam    Kick counts and labor precautions reviewed     Last Hb 10.9. Stopped iron due to constipation. Recheck CBC today     Dates reviewed for R CS in 39th week. Patient to discuss with  and will message in. Ill plan to do and block office schedule if needed   Planning TDAP at pharmacy (out in office)   Colace sent in for constipation     Orders Placed This Encounter   Medications    docusate sodium (COLACE) 100 MG capsule     Sig: Take 1 capsule by mouth 2 times daily     Dispense:  60 capsule     Refill:  2      Orders Placed This Encounter   Procedures    CBC     Standing Status:   Future     Standing Expiration Date:   2025          Katie Mcarthur DO

## 2024-02-01 ENCOUNTER — ROUTINE PRENATAL (OUTPATIENT)
Dept: OBGYN CLINIC | Age: 29
End: 2024-02-01
Payer: MEDICAID

## 2024-02-01 VITALS — DIASTOLIC BLOOD PRESSURE: 80 MMHG | WEIGHT: 206.4 LBS | BODY MASS INDEX: 32.33 KG/M2 | SYSTOLIC BLOOD PRESSURE: 118 MMHG

## 2024-02-01 DIAGNOSIS — O99.013 ANEMIA AFFECTING PREGNANCY IN THIRD TRIMESTER: ICD-10-CM

## 2024-02-01 DIAGNOSIS — O34.219 PREVIOUS CESAREAN SECTION COMPLICATING PREGNANCY: ICD-10-CM

## 2024-02-01 DIAGNOSIS — O09.93 SUPERVISION OF HIGH RISK PREGNANCY IN THIRD TRIMESTER: ICD-10-CM

## 2024-02-01 DIAGNOSIS — Z36.85 ANTENATAL SCREENING FOR STREPTOCOCCUS B: Primary | ICD-10-CM

## 2024-02-01 PROCEDURE — 99213 OFFICE O/P EST LOW 20 MIN: CPT | Performed by: OBSTETRICS & GYNECOLOGY

## 2024-02-01 RX ORDER — HYDROCORTISONE ACETATE 25 MG/1
25 SUPPOSITORY RECTAL EVERY 12 HOURS
Qty: 24 SUPPOSITORY | Refills: 2 | Status: SHIPPED | OUTPATIENT
Start: 2024-02-01

## 2024-02-01 NOTE — PROGRESS NOTES
GLYNN. .  35w5d   Denies LOF, VB, Ctxs.  Good FM.      + constipation, hemorrhoids. Using OTC topicals. Did not  colace I sent at last visit. Is using Miralax    Vitals:    24 1507   BP: 118/80      SVE cl/50/-3    Supervision of high risk pregnancy in third trimester  GBS today   Kick counts and labor precautions reviewed      Previous  section complicating pregnancy  Desires repeat CS. Requested for 3/01 with me     Orders Placed This Encounter   Medications    hydrocortisone (ANUSOL-HC) 25 MG suppository     Sig: Place 1 suppository rectally in the morning and 1 suppository in the evening.     Dispense:  24 suppository     Refill:  2    Misc. Devices (SITZ BATH) MISC     Sig: Use daily as needed for hemorrhoids.     Dispense:  7 each     Refill:  2        Orders Placed This Encounter   Procedures    Culture, Strep B Screen, Vaginal/Rectal     Standing Status:   Future     Standing Expiration Date:   2025        Katie Mcarthur DO

## 2024-02-04 LAB
BACTERIA SPEC CULT: NORMAL
SERVICE CMNT-IMP: NORMAL

## 2024-02-05 ENCOUNTER — HOSPITAL ENCOUNTER (OUTPATIENT)
Age: 29
Discharge: HOME | DRG: 566 | End: 2024-02-05
Attending: OBSTETRICS & GYNECOLOGY | Admitting: OBSTETRICS & GYNECOLOGY
Payer: MEDICAID

## 2024-02-05 VITALS
RESPIRATION RATE: 20 BRPM | BODY MASS INDEX: 32.33 KG/M2 | HEIGHT: 67 IN | OXYGEN SATURATION: 100 % | DIASTOLIC BLOOD PRESSURE: 81 MMHG | TEMPERATURE: 97.9 F | SYSTOLIC BLOOD PRESSURE: 132 MMHG | HEART RATE: 100 BPM

## 2024-02-05 DIAGNOSIS — O22.43 HEMORRHOIDS DURING PREGNANCY IN THIRD TRIMESTER: Primary | ICD-10-CM

## 2024-02-05 LAB
BACTERIA SPEC CULT: NORMAL
SERVICE CMNT-IMP: NORMAL

## 2024-02-05 PROCEDURE — 99283 EMERGENCY DEPT VISIT LOW MDM: CPT

## 2024-02-05 NOTE — H&P
gestation  Not in labor;   Reassuring fetal status    Plan: Discharge home, follow-up at next OB appointment  Sent Rx for lidocaine jelly for temporary hemorrhoid relief with bowel movements

## 2024-02-05 NOTE — DISCHARGE INSTRUCTIONS
Oral Rehydration: Care Instructions  Overview     Dehydration occurs when your body loses too much water. This can happen if you do not drink enough fluids or lose a lot of fluid due to diarrhea, vomiting, sweating, or fever. Being dehydrated can cause health problems and can even be life-threatening.  To replace lost fluids, it helps to drink liquids that contain special minerals called electrolytes. Electrolytes keep your body working well. Plain water does not have electrolytes. You also need to rest to prevent more fluid loss.  Follow-up care is a key part of your treatment and safety. Be sure to make and go to all appointments, and call your doctor if you are having problems. It's also a good idea to know your test results and keep a list of the medicines you take.  How can you care for yourself at home?  Take frequent sips of an electrolyte replacement drink. These can be found in grocery stores and drugstores. Examples of these are Pedialyte and Rehydralyte. These replace both fluid and important minerals called electrolytes you need for balance in your blood.  Do not drink any alcohol. It can make you dehydrated.  Drink plenty of fluids. If you have kidney, heart, or liver disease and have to limit fluids, talk with your doctor before you increase the amount of fluids you drink.  When should you call for help?   Call 911 anytime you think you may need emergency care. For example, call if:    You have signs of severe dehydration, such as:  You are confused or unable to stay awake.  You passed out (lost consciousness).   Call your doctor now or seek immediate medical care if:    You still have signs of dehydration. You have sunken eyes, a dry mouth, and pass only a little urine.     You are dizzy or lightheaded, or you feel like you may faint.     You are not able to keep down fluids.   Watch closely for changes in your health, and be sure to contact your doctor if:    You do not get better as expected.  or this instruction, always ask your healthcare professional. [employer], [employer] disclaims any warranty or liability for your use of this information.         Week 38 of Your Pregnancy: Care Instructions  Believe it or not, your baby is almost here. You may notice how your baby responds to sounds, warmth, cold, and light. You may even know what kind of music your baby likes.    Even if you expect a vaginal birth, it's a good idea to learn about  section ().  is the delivery of a baby through a cut (incision) in your belly. It's a major surgery, so it has more risks than a vaginal birth.   During the first 2 weeks after the birth, limit visitors. Don't allow visitors who have colds or infections. Ask visitors to wash their hands before they touch your baby. And never let anyone smoke around your baby.     Know about unplanned C-sections.  Reasons for an unplanned  include labor that slows or stops, signs of distress in your baby, and high blood pressure or other problems for you.     Know about planned C-sections.  If your baby isn't in a head-down position for delivery (breech position), your doctor may plan a . Or you may have a planned  if you're having twins or more.     Get as much help as you can while you're in the hospital.  You can learn about feeding, diapering, and bathing your baby.     Talk to your doctor or midwife about how to care for the umbilical cord stump.  If your baby will be circumcised, also ask about how to care for that.     Ask friends or family for help, as you need it.  If you can, have another adult in your home for at least 2 or 3 days after the birth.     Try to nap when your baby naps.  This may be your best chance to get some sleep.     Watch for changes in your mental health.  For the first 1 to 2 weeks after birth, it's common to cry or feel sad or irritable. If these feelings last for more than 2 weeks, you may have

## 2024-02-05 NOTE — PROGRESS NOTES
Dr. Felipe at bedside, SVE cervix closed thick and high. Patient with extreme difficulty tolerating exam due to pain associated with hemorrhoids. Patient advised MD she was constipated took a laxative and used her suppository for her hemorrhoids and had severe diarrhea afterwards. Patient was offered IV hydration for possible dehydration causing cramping. Patient declines. Per patient she would like to do PO hydration. MD advised she would send script for numbing gel to patients pharmacy as well as a referral to a general surgeon for her hemorrhoids. Patient verbalizes understanding and denies any further questions and is cleared to be discharged home.

## 2024-02-05 NOTE — PROGRESS NOTES
Pt to room cristian 2 for triage with chief complaint of cramping. Assessment begins, EFM and [place] applied to a soft non tender abdomen and tracing well.     Dr Felipe called to assess patient.

## 2024-02-07 ENCOUNTER — HOSPITAL ENCOUNTER (INPATIENT)
Age: 29
LOS: 3 days | Discharge: HOME | DRG: 566 | End: 2024-02-10
Attending: OBSTETRICS & GYNECOLOGY | Admitting: OBSTETRICS & GYNECOLOGY
Payer: MEDICAID

## 2024-02-07 ENCOUNTER — APPOINTMENT (OUTPATIENT)
Dept: ULTRASOUND IMAGING | Age: 29
DRG: 566 | End: 2024-02-07
Payer: MEDICAID

## 2024-02-07 ENCOUNTER — APPOINTMENT (OUTPATIENT)
Dept: CT IMAGING | Age: 29
DRG: 566 | End: 2024-02-07
Payer: MEDICAID

## 2024-02-07 PROBLEM — O26.899 ABDOMINAL PAIN IN PREGNANCY, ANTEPARTUM: Status: ACTIVE | Noted: 2024-02-07

## 2024-02-07 PROBLEM — K85.90 ACUTE PANCREATITIS: Status: ACTIVE | Noted: 2024-02-07

## 2024-02-07 PROBLEM — R10.9 ABDOMINAL PAIN IN PREGNANCY, ANTEPARTUM: Status: ACTIVE | Noted: 2024-02-07

## 2024-02-07 LAB
ALBUMIN SERPL-MCNC: 2.1 G/DL (ref 3.5–5)
ALBUMIN/GLOB SERPL: 0.5 (ref 0.4–1.6)
ALP SERPL-CCNC: 192 U/L (ref 50–130)
ALT SERPL-CCNC: 24 U/L (ref 12–65)
AMPHET UR QL SCN: POSITIVE
ANION GAP SERPL CALC-SCNC: 7 MMOL/L (ref 2–11)
ARTERIAL PATENCY WRIST A: POSITIVE
AST SERPL-CCNC: 24 U/L (ref 15–37)
BARBITURATES UR QL SCN: NEGATIVE
BASE DEFICIT BLD-SCNC: 4.3 MMOL/L
BASOPHILS # BLD: 0 K/UL (ref 0–0.2)
BASOPHILS NFR BLD: 0 % (ref 0–2)
BDY SITE: ABNORMAL
BENZODIAZ UR QL: NEGATIVE
BILIRUB SERPL-MCNC: 0.2 MG/DL (ref 0.2–1.1)
BUN SERPL-MCNC: 6 MG/DL (ref 6–23)
CALCIUM SERPL-MCNC: 9.8 MG/DL (ref 8.3–10.4)
CANNABINOIDS UR QL SCN: NEGATIVE
CHLORIDE SERPL-SCNC: 104 MMOL/L (ref 103–113)
CHOLEST SERPL-MCNC: 238 MG/DL
CO2 SERPL-SCNC: 27 MMOL/L (ref 21–32)
COCAINE UR QL SCN: NEGATIVE
CREAT SERPL-MCNC: 0.84 MG/DL (ref 0.6–1)
DIFFERENTIAL METHOD BLD: ABNORMAL
EOSINOPHIL # BLD: 0.1 K/UL (ref 0–0.8)
EOSINOPHIL NFR BLD: 1 % (ref 0.5–7.8)
ERYTHROCYTE [DISTWIDTH] IN BLOOD BY AUTOMATED COUNT: 13.1 % (ref 11.9–14.6)
ETHANOL SERPL-MCNC: <3 MG/DL (ref 0–0.08)
GAS FLOW.O2 O2 DELIVERY SYS: ABNORMAL
GLOBULIN SER CALC-MCNC: 4.6 G/DL (ref 2.8–4.5)
GLUCOSE SERPL-MCNC: 98 MG/DL (ref 65–100)
HCO3 BLD-SCNC: 20.6 MMOL/L (ref 22–26)
HCT VFR BLD AUTO: 33.2 % (ref 35.8–46.3)
HDLC SERPL-MCNC: 56 MG/DL (ref 40–60)
HDLC SERPL: 4.3
HGB BLD-MCNC: 10.8 G/DL (ref 11.7–15.4)
IMM GRANULOCYTES # BLD AUTO: 0 K/UL (ref 0–0.5)
IMM GRANULOCYTES NFR BLD AUTO: 0 % (ref 0–5)
LACTATE SERPL-SCNC: 0.9 MMOL/L (ref 0.4–2)
LDLC SERPL CALC-MCNC: 139.2 MG/DL
LIPASE SERPL-CCNC: 2002 U/L (ref 73–393)
LYMPHOCYTES # BLD: 1.6 K/UL (ref 0.5–4.6)
LYMPHOCYTES NFR BLD: 17 % (ref 13–44)
MAGNESIUM SERPL-MCNC: 1.2 MG/DL (ref 1.8–2.4)
MAGNESIUM SERPL-MCNC: 2.2 MG/DL (ref 1.8–2.4)
MCH RBC QN AUTO: 27.6 PG (ref 26.1–32.9)
MCHC RBC AUTO-ENTMCNC: 32.5 G/DL (ref 31.4–35)
MCV RBC AUTO: 84.9 FL (ref 82–102)
METHADONE UR QL: NEGATIVE
MONOCYTES # BLD: 0.7 K/UL (ref 0.1–1.3)
MONOCYTES NFR BLD: 8 % (ref 4–12)
NEUTS SEG # BLD: 7 K/UL (ref 1.7–8.2)
NEUTS SEG NFR BLD: 74 % (ref 43–78)
NRBC # BLD: 0 K/UL (ref 0–0.2)
OPIATES UR QL: POSITIVE
PCO2 BLD: 36.2 MMHG (ref 35–45)
PCP UR QL: NEGATIVE
PH BLD: 7.36 (ref 7.35–7.45)
PHOSPHATE SERPL-MCNC: 3.6 MG/DL (ref 2.5–4.5)
PLATELET # BLD AUTO: 227 K/UL (ref 150–450)
PMV BLD AUTO: 10.4 FL (ref 9.4–12.3)
PO2 BLD: 86 MMHG (ref 75–100)
POTASSIUM SERPL-SCNC: 3.7 MMOL/L (ref 3.5–5.1)
PROT SERPL-MCNC: 6.7 G/DL (ref 6.3–8.2)
RBC # BLD AUTO: 3.91 M/UL (ref 4.05–5.2)
SAO2 % BLD: 96.2 % (ref 95–98)
SERVICE CMNT-IMP: ABNORMAL
SODIUM SERPL-SCNC: 138 MMOL/L (ref 136–146)
SPECIMEN TYPE: ABNORMAL
TRIGL SERPL-MCNC: 214 MG/DL (ref 35–150)
VLDLC SERPL CALC-MCNC: 42.8 MG/DL (ref 6–23)
WBC # BLD AUTO: 9.4 K/UL (ref 4.3–11.1)

## 2024-02-07 PROCEDURE — 2580000003 HC RX 258: Performed by: OBSTETRICS & GYNECOLOGY

## 2024-02-07 PROCEDURE — 99223 1ST HOSP IP/OBS HIGH 75: CPT | Performed by: OBSTETRICS & GYNECOLOGY

## 2024-02-07 PROCEDURE — 1100000000 HC RM PRIVATE

## 2024-02-07 PROCEDURE — 74150 CT ABDOMEN W/O CONTRAST: CPT

## 2024-02-07 PROCEDURE — 85025 COMPLETE CBC W/AUTO DIFF WBC: CPT

## 2024-02-07 PROCEDURE — 4A1HX4Z MONITORING OF PRODUCTS OF CONCEPTION, CARDIAC ELECTRICAL ACTIVITY, EXTERNAL APPROACH: ICD-10-PCS | Performed by: OBSTETRICS & GYNECOLOGY

## 2024-02-07 PROCEDURE — 6370000000 HC RX 637 (ALT 250 FOR IP): Performed by: OBSTETRICS & GYNECOLOGY

## 2024-02-07 PROCEDURE — 36600 WITHDRAWAL OF ARTERIAL BLOOD: CPT

## 2024-02-07 PROCEDURE — 2500000003 HC RX 250 WO HCPCS: Performed by: OBSTETRICS & GYNECOLOGY

## 2024-02-07 PROCEDURE — 83690 ASSAY OF LIPASE: CPT

## 2024-02-07 PROCEDURE — 6360000002 HC RX W HCPCS: Performed by: OBSTETRICS & GYNECOLOGY

## 2024-02-07 PROCEDURE — 80307 DRUG TEST PRSMV CHEM ANLYZR: CPT

## 2024-02-07 PROCEDURE — 99285 EMERGENCY DEPT VISIT HI MDM: CPT

## 2024-02-07 PROCEDURE — 76705 ECHO EXAM OF ABDOMEN: CPT

## 2024-02-07 PROCEDURE — A4216 STERILE WATER/SALINE, 10 ML: HCPCS | Performed by: OBSTETRICS & GYNECOLOGY

## 2024-02-07 PROCEDURE — 84100 ASSAY OF PHOSPHORUS: CPT

## 2024-02-07 PROCEDURE — 82803 BLOOD GASES ANY COMBINATION: CPT

## 2024-02-07 PROCEDURE — 80053 COMPREHEN METABOLIC PANEL: CPT

## 2024-02-07 PROCEDURE — 80061 LIPID PANEL: CPT

## 2024-02-07 PROCEDURE — 83735 ASSAY OF MAGNESIUM: CPT

## 2024-02-07 PROCEDURE — 82077 ASSAY SPEC XCP UR&BREATH IA: CPT

## 2024-02-07 PROCEDURE — 83605 ASSAY OF LACTIC ACID: CPT

## 2024-02-07 RX ORDER — SODIUM CHLORIDE, SODIUM LACTATE, POTASSIUM CHLORIDE, CALCIUM CHLORIDE 600; 310; 30; 20 MG/100ML; MG/100ML; MG/100ML; MG/100ML
INJECTION, SOLUTION INTRAVENOUS CONTINUOUS
Status: DISCONTINUED | OUTPATIENT
Start: 2024-02-07 | End: 2024-02-08 | Stop reason: ALTCHOICE

## 2024-02-07 RX ORDER — ACETAMINOPHEN 325 MG/1
650 TABLET ORAL EVERY 6 HOURS PRN
Status: DISPENSED | OUTPATIENT
Start: 2024-02-07 | End: 2024-02-09

## 2024-02-07 RX ORDER — MORPHINE SULFATE 2 MG/ML
2 INJECTION, SOLUTION INTRAMUSCULAR; INTRAVENOUS
Status: DISCONTINUED | OUTPATIENT
Start: 2024-02-07 | End: 2024-02-07

## 2024-02-07 RX ORDER — SODIUM CHLORIDE, SODIUM LACTATE, POTASSIUM CHLORIDE, CALCIUM CHLORIDE 600; 310; 30; 20 MG/100ML; MG/100ML; MG/100ML; MG/100ML
INJECTION, SOLUTION INTRAVENOUS CONTINUOUS
Status: DISCONTINUED | OUTPATIENT
Start: 2024-02-07 | End: 2024-02-07

## 2024-02-07 RX ORDER — ENOXAPARIN SODIUM 100 MG/ML
40 INJECTION SUBCUTANEOUS DAILY
Status: DISCONTINUED | OUTPATIENT
Start: 2024-02-07 | End: 2024-02-09 | Stop reason: ALTCHOICE

## 2024-02-07 RX ORDER — POTASSIUM CHLORIDE 20 MEQ/1
40 TABLET, EXTENDED RELEASE ORAL PRN
Status: DISCONTINUED | OUTPATIENT
Start: 2024-02-07 | End: 2024-02-10 | Stop reason: HOSPADM

## 2024-02-07 RX ORDER — MORPHINE SULFATE 2 MG/ML
2 INJECTION, SOLUTION INTRAMUSCULAR; INTRAVENOUS
Status: DISCONTINUED | OUTPATIENT
Start: 2024-02-07 | End: 2024-02-08

## 2024-02-07 RX ORDER — PANTOPRAZOLE SODIUM 40 MG/1
40 TABLET, DELAYED RELEASE ORAL
Status: DISCONTINUED | OUTPATIENT
Start: 2024-02-07 | End: 2024-02-10 | Stop reason: HOSPADM

## 2024-02-07 RX ORDER — ONDANSETRON 2 MG/ML
8 INJECTION INTRAMUSCULAR; INTRAVENOUS ONCE
Status: COMPLETED | OUTPATIENT
Start: 2024-02-07 | End: 2024-02-07

## 2024-02-07 RX ORDER — MORPHINE SULFATE 4 MG/ML
4 INJECTION, SOLUTION INTRAMUSCULAR; INTRAVENOUS
Status: DISCONTINUED | OUTPATIENT
Start: 2024-02-07 | End: 2024-02-07

## 2024-02-07 RX ORDER — SODIUM CHLORIDE, SODIUM LACTATE, POTASSIUM CHLORIDE, AND CALCIUM CHLORIDE .6; .31; .03; .02 G/100ML; G/100ML; G/100ML; G/100ML
1000 INJECTION, SOLUTION INTRAVENOUS ONCE
Status: DISCONTINUED | OUTPATIENT
Start: 2024-02-07 | End: 2024-02-10 | Stop reason: HOSPADM

## 2024-02-07 RX ORDER — SODIUM CHLORIDE 0.9 % (FLUSH) 0.9 %
5-40 SYRINGE (ML) INJECTION EVERY 12 HOURS SCHEDULED
Status: DISCONTINUED | OUTPATIENT
Start: 2024-02-07 | End: 2024-02-10 | Stop reason: HOSPADM

## 2024-02-07 RX ORDER — MAGNESIUM HYDROXIDE/ALUMINUM HYDROXICE/SIMETHICONE 120; 1200; 1200 MG/30ML; MG/30ML; MG/30ML
30 SUSPENSION ORAL EVERY 6 HOURS PRN
Status: DISCONTINUED | OUTPATIENT
Start: 2024-02-07 | End: 2024-02-10 | Stop reason: HOSPADM

## 2024-02-07 RX ORDER — MORPHINE SULFATE 4 MG/ML
4 INJECTION, SOLUTION INTRAMUSCULAR; INTRAVENOUS
Status: DISCONTINUED | OUTPATIENT
Start: 2024-02-07 | End: 2024-02-08 | Stop reason: DRUGHIGH

## 2024-02-07 RX ORDER — SODIUM CHLORIDE 9 MG/ML
INJECTION, SOLUTION INTRAVENOUS PRN
Status: DISCONTINUED | OUTPATIENT
Start: 2024-02-07 | End: 2024-02-10 | Stop reason: HOSPADM

## 2024-02-07 RX ORDER — ONDANSETRON 4 MG/1
4 TABLET, ORALLY DISINTEGRATING ORAL EVERY 8 HOURS PRN
Status: DISCONTINUED | OUTPATIENT
Start: 2024-02-07 | End: 2024-02-08 | Stop reason: DRUGHIGH

## 2024-02-07 RX ORDER — POTASSIUM CHLORIDE 7.45 MG/ML
10 INJECTION INTRAVENOUS PRN
Status: DISCONTINUED | OUTPATIENT
Start: 2024-02-07 | End: 2024-02-10 | Stop reason: HOSPADM

## 2024-02-07 RX ORDER — ONDANSETRON 2 MG/ML
4 INJECTION INTRAMUSCULAR; INTRAVENOUS EVERY 6 HOURS PRN
Status: DISCONTINUED | OUTPATIENT
Start: 2024-02-07 | End: 2024-02-08

## 2024-02-07 RX ORDER — SODIUM CHLORIDE 0.9 % (FLUSH) 0.9 %
5-40 SYRINGE (ML) INJECTION PRN
Status: DISCONTINUED | OUTPATIENT
Start: 2024-02-07 | End: 2024-02-10 | Stop reason: HOSPADM

## 2024-02-07 RX ORDER — MAGNESIUM SULFATE IN WATER 40 MG/ML
2000 INJECTION, SOLUTION INTRAVENOUS PRN
Status: DISCONTINUED | OUTPATIENT
Start: 2024-02-07 | End: 2024-02-10 | Stop reason: HOSPADM

## 2024-02-07 RX ADMIN — SODIUM CHLORIDE, SODIUM LACTATE, POTASSIUM CHLORIDE, AND CALCIUM CHLORIDE 1000 ML: .6; .31; .03; .02 INJECTION, SOLUTION INTRAVENOUS at 04:45

## 2024-02-07 RX ADMIN — MORPHINE SULFATE 4 MG: 4 INJECTION, SOLUTION INTRAMUSCULAR; INTRAVENOUS at 08:37

## 2024-02-07 RX ADMIN — PANTOPRAZOLE SODIUM 40 MG: 40 TABLET, DELAYED RELEASE ORAL at 06:04

## 2024-02-07 RX ADMIN — MORPHINE SULFATE 4 MG: 4 INJECTION, SOLUTION INTRAMUSCULAR; INTRAVENOUS at 13:14

## 2024-02-07 RX ADMIN — MORPHINE SULFATE 2 MG: 2 INJECTION, SOLUTION INTRAMUSCULAR; INTRAVENOUS at 05:57

## 2024-02-07 RX ADMIN — SODIUM CHLORIDE, POTASSIUM CHLORIDE, SODIUM LACTATE AND CALCIUM CHLORIDE: 600; 310; 30; 20 INJECTION, SOLUTION INTRAVENOUS at 11:05

## 2024-02-07 RX ADMIN — PANTOPRAZOLE SODIUM 40 MG: 40 TABLET, DELAYED RELEASE ORAL at 16:20

## 2024-02-07 RX ADMIN — SODIUM CHLORIDE, POTASSIUM CHLORIDE, SODIUM LACTATE AND CALCIUM CHLORIDE 250 ML/HR: 600; 310; 30; 20 INJECTION, SOLUTION INTRAVENOUS at 05:59

## 2024-02-07 RX ADMIN — MAGNESIUM SULFATE HEPTAHYDRATE 2000 MG: 40 INJECTION, SOLUTION INTRAVENOUS at 09:53

## 2024-02-07 RX ADMIN — MORPHINE SULFATE 2 MG: 2 INJECTION, SOLUTION INTRAMUSCULAR; INTRAVENOUS at 20:15

## 2024-02-07 RX ADMIN — ENOXAPARIN SODIUM 40 MG: 100 INJECTION SUBCUTANEOUS at 08:50

## 2024-02-07 RX ADMIN — ALUMINUM HYDROXIDE, MAGNESIUM HYDROXIDE, DIMETHICONE 30 ML: 200; 200; 20 LIQUID ORAL at 08:50

## 2024-02-07 RX ADMIN — FAMOTIDINE 20 MG: 10 INJECTION, SOLUTION INTRAVENOUS at 08:39

## 2024-02-07 RX ADMIN — ONDANSETRON 8 MG: 2 INJECTION INTRAMUSCULAR; INTRAVENOUS at 04:44

## 2024-02-07 RX ADMIN — MORPHINE SULFATE 4 MG: 4 INJECTION, SOLUTION INTRAMUSCULAR; INTRAVENOUS at 15:35

## 2024-02-07 RX ADMIN — SODIUM CHLORIDE, POTASSIUM CHLORIDE, SODIUM LACTATE AND CALCIUM CHLORIDE: 600; 310; 30; 20 INJECTION, SOLUTION INTRAVENOUS at 16:47

## 2024-02-07 RX ADMIN — MAGNESIUM SULFATE HEPTAHYDRATE 2000 MG: 40 INJECTION, SOLUTION INTRAVENOUS at 11:22

## 2024-02-07 RX ADMIN — MORPHINE SULFATE 4 MG: 4 INJECTION, SOLUTION INTRAMUSCULAR; INTRAVENOUS at 17:34

## 2024-02-07 RX ADMIN — ACETAMINOPHEN 650 MG: 325 TABLET, FILM COATED ORAL at 18:59

## 2024-02-07 RX ADMIN — MORPHINE SULFATE 4 MG: 4 INJECTION, SOLUTION INTRAMUSCULAR; INTRAVENOUS at 10:34

## 2024-02-07 RX ADMIN — FAMOTIDINE 20 MG: 10 INJECTION, SOLUTION INTRAVENOUS at 22:43

## 2024-02-07 ASSESSMENT — PAIN SCALES - GENERAL
PAINLEVEL_OUTOF10: 8
PAINLEVEL_OUTOF10: 7
PAINLEVEL_OUTOF10: 8
PAINLEVEL_OUTOF10: 5
PAINLEVEL_OUTOF10: 8
PAINLEVEL_OUTOF10: 8
PAINLEVEL_OUTOF10: 7

## 2024-02-07 ASSESSMENT — PAIN DESCRIPTION - LOCATION
LOCATION: ABDOMEN
LOCATION: ABDOMEN;BACK
LOCATION: ABDOMEN

## 2024-02-07 ASSESSMENT — PAIN DESCRIPTION - DESCRIPTORS: DESCRIPTORS: ACHING

## 2024-02-07 NOTE — PROGRESS NOTES
Pt to room TEREZA 1 for triage with chief complaint of acute abdominal pain that is constant for the past 3 hours. Per patient she has had diarrhea for the past 2 days. Patient states pain is all over abdomen and constant. Assessment begins, EFM and [place] applied to a soft non tender abdomen and tracing well.     Dr Payne called to assess patient.

## 2024-02-07 NOTE — PROGRESS NOTES
Bedside ultrasound performed. Active fetal movement, normal fluid, and normal cardiac activity were noted. The lower uterine segment appeared intact and the fetus appeared entirely intrauterine.

## 2024-02-07 NOTE — CONSULTS
1,000 mL  1,000 mL IntraVENous Once    pantoprazole (PROTONIX) tablet 40 mg  40 mg Oral BID AC    lactated ringers IV soln infusion   IntraVENous Continuous    Followed by    lactated ringers IV soln infusion   IntraVENous Continuous    sodium chloride flush 0.9 % injection 5-40 mL  5-40 mL IntraVENous 2 times per day    sodium chloride flush 0.9 % injection 5-40 mL  5-40 mL IntraVENous PRN    0.9 % sodium chloride infusion   IntraVENous PRN    potassium chloride (KLOR-CON M) extended release tablet 40 mEq  40 mEq Oral PRN    Or    potassium bicarb-citric acid (EFFER-K) effervescent tablet 40 mEq  40 mEq Oral PRN    Or    potassium chloride 10 mEq/100 mL IVPB (Peripheral Line)  10 mEq IntraVENous PRN    magnesium sulfate 2000 mg in 50 mL IVPB premix  2,000 mg IntraVENous PRN    ondansetron (ZOFRAN-ODT) disintegrating tablet 4 mg  4 mg Oral Q8H PRN    Or    ondansetron (ZOFRAN) injection 4 mg  4 mg IntraVENous Q6H PRN    enoxaparin (LOVENOX) injection 40 mg  40 mg SubCUTAneous Daily    acetaminophen (TYLENOL) tablet 650 mg  650 mg Oral Q6H PRN    morphine (PF) injection 2 mg  2 mg IntraVENous Q2H PRN    Or    morphine sulfate (PF) injection 4 mg  4 mg IntraVENous Q2H PRN    famotidine (PEPCID) 20 mg in sodium chloride (PF) 0.9 % 10 mL injection  20 mg IntraVENous BID    aluminum & magnesium hydroxide-simethicone (MAALOX) 200-200-20 MG/5ML suspension 30 mL  30 mL Oral Q6H PRN       Objective:   Patient Vitals for the past 24 hrs:   Temp Pulse Resp BP SpO2   02/07/24 1203 97.4 °F (36.3 °C) 92 16 117/77 99 %   02/07/24 0756 97.4 °F (36.3 °C) 94 18 123/71 100 %   02/07/24 0616 97.7 °F (36.5 °C) 63 18 137/76 99 %   02/07/24 0353 97.6 °F (36.4 °C) 71 20 127/79 99 %   02/07/24 0351 -- 71 -- 127/79 --       Oxygen Therapy  SpO2: 99 %  O2 Device: None (Room air)  Skin Assessment: Clean, dry, & intact  Blood Gas  Performed?: Yes  Juan F's Test #1: Collateral flow confirmed  Site #1: Right Radial  Site Prepped #1: Yes  Number of  Attempts #1: 1  Pressure Held #1: Yes  Complications #1: None  Post-procedure #1: Standard  Specimen Status #1: Point of care  How Tolerated?: Tolerated well  Oxygen Therapy: None (Room air)    Estimated body mass index is 32.33 kg/m² as calculated from the following:    Height as of 2/5/24: 1.702 m (5' 7\").    Weight as of 2/1/24: 93.6 kg (206 lb 6.4 oz).    Intake/Output Summary (Last 24 hours) at 2/7/2024 1422  Last data filed at 2/7/2024 1324  Gross per 24 hour   Intake 1083.55 ml   Output 725 ml   Net 358.55 ml         Physical Exam:    General:    Well developed, sleepy  Head:  Normocephalic, atraumatic  Eyes:  Sclerae appear normal.  Pupils equally round.  ENT:  Nares appear normal.  Moist oral mucosa  Neck:  Trachea midline   CV:   RRR.  No gross murmur  Lungs:   Decreased lung fields (poor inspiratory effort); no gross wheezing / rhonchi, no accessory muscles used  Abdomen:   Protuberant, tenderness elicited on palpation, greatest at LUQ; +BSx4  Extremities: Non-pitting edema b/l LE; + mvmt x 4  Skin:     No rashes.  Normal coloration.   Warm and dry.    Neuro:  Sleepy; Ox3.  Sensation intact.   Psych:  Normal mood and affect.      I have personally reviewed labs and tests showing:  Recent Labs:  Recent Results (from the past 24 hour(s))   CBC with Auto Differential    Collection Time: 02/07/24  4:34 AM   Result Value Ref Range    WBC 9.4 4.3 - 11.1 K/uL    RBC 3.91 (L) 4.05 - 5.2 M/uL    Hemoglobin 10.8 (L) 11.7 - 15.4 g/dL    Hematocrit 33.2 (L) 35.8 - 46.3 %    MCV 84.9 82.0 - 102.0 FL    MCH 27.6 26.1 - 32.9 PG    MCHC 32.5 31.4 - 35.0 g/dL    RDW 13.1 11.9 - 14.6 %    Platelets 227 150 - 450 K/uL    MPV 10.4 9.4 - 12.3 FL    nRBC 0.00 0.0 - 0.2 K/uL    Differential Type AUTOMATED      Neutrophils % 74 43 - 78 %    Lymphocytes % 17 13 - 44 %    Monocytes % 8 4.0 - 12.0 %    Eosinophils % 1 0.5 - 7.8 %    Basophils % 0 0.0 - 2.0 %    Immature Granulocytes 0 0.0 - 5.0 %    Neutrophils Absolute 7.0 1.7 -

## 2024-02-07 NOTE — H&P
History & Physical    Name: Kelsi Velazco MRN: [id number]  SSN: [id number]    YOB: 1995  Age: 28 y.o.  Sex: female      Subjective:     Reason for Triage visit:  36w4d and abdominal pain    History of Present Illness: Ms. Velazco is a 28 y.o.  female  with an estimated gestational age of 36w4d with Estimated Date of Delivery: 3/2/24. Patient states that she is having severe generalized abdominal pain that is constant for the past 3 hours.  Reports diarrhea x 2 days since using a rectal suppository for hemorrhoids.  Patient actively vomiting in exam room and states this is the first time she has thrown up.  Denies ill contacts at home.  Appears unable to get in a comfortable position and states she cannot tolerate the pain.      Good FM.  No vaginal bleeding. No LOF.  Denies fever or chills.  States the pain is also radiating into her upper back.  Denies contractions.  States she has tried Gas- X tablets at home and Tylenol with no relief in pain.  C/o severe heartburn not alleviated with home Tums or Prilosec.    Pregnancy has been complicated by:  Cystic fibrosis carrier  History of suicide attempt - 2018 (via hanging while in long term)  Marginal cord insertion  Rh negative  Depression  Previous child with congenital anomaly  Anemia  History of drug use- (THC and meth), UDS 23 negative  Prior  x 2- for planned repeat  Marginal placenta (RESOLVED)    Patient denies chest pain, fever, shortness of breath, vaginal bleeding , and vaginal leaking of fluid .    OB History    Para Term  AB Living   6 4 4   1 4   SAB IAB Ectopic Molar Multiple Live Births   1         4      # Outcome Date GA Lbr Obdulio/2nd Weight Sex Delivery Anes PTL Lv   6 Current            5 SAB 2021           4 Term 21 39w4d  3.544 kg (7 lb 13 oz) M CS-LTranv Spinal  BERENICE   3 Term 13 39w6d  3.99 kg (8 lb 12.7 oz) M CS-LTranv  N BERENICE      Complications: Failure to Progress in First Stage  31.4 - 35.0 g/dL    RDW 13.1 11.9 - 14.6 %    Platelets 227 150 - 450 K/uL    MPV 10.4 9.4 - 12.3 FL    nRBC 0.00 0.0 - 0.2 K/uL    Differential Type AUTOMATED      Neutrophils % 74 43 - 78 %    Lymphocytes % 17 13 - 44 %    Monocytes % 8 4.0 - 12.0 %    Eosinophils % 1 0.5 - 7.8 %    Basophils % 0 0.0 - 2.0 %    Immature Granulocytes 0 0.0 - 5.0 %    Neutrophils Absolute 7.0 1.7 - 8.2 K/UL    Lymphocytes Absolute 1.6 0.5 - 4.6 K/UL    Monocytes Absolute 0.7 0.1 - 1.3 K/UL    Eosinophils Absolute 0.1 0.0 - 0.8 K/UL    Basophils Absolute 0.0 0.0 - 0.2 K/UL    Absolute Immature Granulocyte 0.0 0.0 - 0.5 K/UL   Comprehensive Metabolic Panel    Collection Time: 02/07/24  4:34 AM   Result Value Ref Range    Sodium 138 136 - 146 mmol/L    Potassium 3.7 3.5 - 5.1 mmol/L    Chloride 104 103 - 113 mmol/L    CO2 27 21 - 32 mmol/L    Anion Gap 7 2 - 11 mmol/L    Glucose 98 65 - 100 mg/dL    BUN 6 6 - 23 MG/DL    Creatinine 0.84 0.6 - 1.0 MG/DL    Est, Glom Filt Rate >60 >60 ml/min/1.73m2    Calcium 9.8 8.3 - 10.4 MG/DL    Total Bilirubin 0.2 0.2 - 1.1 MG/DL    ALT 24 12 - 65 U/L    AST 24 15 - 37 U/L    Alk Phosphatase 192 (H) 50 - 130 U/L    Total Protein 6.7 6.3 - 8.2 g/dL    Albumin 2.1 (L) 3.5 - 5.0 g/dL    Globulin 4.6 (H) 2.8 - 4.5 g/dL    Albumin/Globulin Ratio 0.5 0.4 - 1.6     Lipase    Collection Time: 02/07/24  4:34 AM   Result Value Ref Range    Lipase 2,002 (H) 73 - 393 U/L   Urine Drug Screen    Collection Time: 02/07/24  4:34 AM   Result Value Ref Range    PCP, Urine Negative      Benzodiazepines, Urine Negative      Cocaine, Urine Negative      Amphetamine, Urine Positive      Methadone, Urine Negative      THC, TH-Cannabinol, Urine Negative      Opiates, Urine Positive      Barbiturates, Urine Negative       POC urine dip - negative    Assessment and Plan:   36w4d with acute abdominal pain  Nausea & vomiting  Pancreatitis  Positive UDS (opiates & amphetamine)    - Recommend admission for IV fluids and pain

## 2024-02-07 NOTE — CONSULTS
[facility] MATERNAL FETAL MEDICINE    [address] [place] [address]  P- [phone]  [phone]            Maternal Fetal Medicine H&P Note    2024     Chief Complaint:  Pregnancy and acute pancreatitis.  History of Present Illness: 28 y.o.  at 36w4d gestation with pregnancy complicated by history of  section and rh negative blood type who presents with  acute onset abdominal pain . The patient reports recent bouts of diarrhea at home as well as sulfurous belching that preceded intense and sudden onset of abdominal pain. She reported to [facility] where evaluation revealed elevated lipase and acute pancreatitis was diagnosed. Hospitalist service consult and RUQ ultrasound are pending. The patient has received IV hydration and pain control. Labs and vitals have been wnl. As such the patient does not meet SIRS criteria at this time.       She complains of abdominal pain and vomiting this morning and requests pain medication.    Patient denies HA, vision changes.   No regular contractions, LOF, VB. Good FM.   Minimal edema and LE pain.   No chest pain or shortness of breath.       Review of Systems:  Pertinent items are noted in HPI.     History:  OB History    Para Term  AB Living   6 4 4   1 4   SAB IAB Ectopic Molar Multiple Live Births   1         4      # Outcome Date GA Lbr Obdulio/2nd Weight Sex Delivery Anes PTL Lv   6 Current            5 SAB 2021           4 Term 21 39w4d  3.544 kg (7 lb 13 oz) M CS-LTranv Spinal  BERENICE   3 Term 13 39w6d  3.99 kg (8 lb 12.7 oz) M CS-LTranv  N BERENICE      Complications: Failure to Progress in First Stage   2 Term 11 40w4d  3.742 kg (8 lb 4 oz) M Vag-Spont  N BERENICE   1 Term 10/14/07 40w0d  3.09 kg (6 lb 13 oz) F Vag-Spont EPI  BERENICE      Birth Comments: Dr. Suarez; has CF       Past Surgical History:   Procedure Laterality Date     SECTION      x2    TONSILLECTOMY      WISDOM TOOTH EXTRACTION         Past Medical History:  Cervical back: Normal range of motion.   Skin:     General: Skin is warm and dry.      Coloration: Skin is not jaundiced or pale.      Findings: No bruising, erythema, lesion or rash.   Neurological:      General: No focal deficit present.      Mental Status: She is alert and oriented to person, place, and time.   Psychiatric:         Mood and Affect: Mood normal.         Behavior: Behavior normal.         Maternal and Fetal Monitoring:                              Uterine Activity:     none  Fetal Heart Rate:    120s, mod variability,  positive accels, no decels      Labs:   inptlabs: CBC with Differential:    Lab Results   Component Value Date/Time    WBC 9.4 02/07/2024 04:34 AM    RBC 3.91 02/07/2024 04:34 AM    HGB 10.8 02/07/2024 04:34 AM    HCT 33.2 02/07/2024 04:34 AM     02/07/2024 04:34 AM    MCV 84.9 02/07/2024 04:34 AM    MCH 27.6 02/07/2024 04:34 AM    MCHC 32.5 02/07/2024 04:34 AM    RDW 13.1 02/07/2024 04:34 AM    NRBC 0.00 02/07/2024 04:34 AM    LYMPHOPCT 17 02/07/2024 04:34 AM    MONOPCT 8 02/07/2024 04:34 AM    BASOPCT 0 02/07/2024 04:34 AM    MONOSABS 0.7 02/07/2024 04:34 AM    LYMPHSABS 1.6 02/07/2024 04:34 AM    EOSABS 0.1 02/07/2024 04:34 AM    BASOSABS 0.0 02/07/2024 04:34 AM    DIFFTYPE AUTOMATED 02/07/2024 04:34 AM     CMP:    Lab Results   Component Value Date/Time     02/07/2024 04:34 AM    K 3.7 02/07/2024 04:34 AM     02/07/2024 04:34 AM    CO2 27 02/07/2024 04:34 AM    BUN 6 02/07/2024 04:34 AM    CREATININE 0.84 02/07/2024 04:34 AM    GFRAA >60 02/21/2021 08:51 PM    AGRATIO 0.5 02/07/2024 04:34 AM    AGRATIO 0.6 02/21/2021 08:51 PM    LABGLOM >60 02/07/2024 04:34 AM    GLUCOSE 98 02/07/2024 04:34 AM    PROT 6.7 02/07/2024 04:34 AM    LABALBU 2.1 02/07/2024 04:34 AM    CALCIUM 9.8 02/07/2024 04:34 AM    BILITOT 0.2 02/07/2024 04:34 AM    ALKPHOS 192 02/07/2024 04:34 AM    ALKPHOS 179 02/21/2021 08:51 PM    AST 24 02/07/2024 04:34 AM    ALT 24 02/07/2024 04:34 AM

## 2024-02-07 NOTE — PROGRESS NOTES
Patient to be admitted to L & D for observation and to have consult with Internal medicine hospitalist. Patient to be moved to [facility] for continuation of care.
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
## 2024-02-07 NOTE — PROGRESS NOTES
SBAR report received from ROSLYN Gonzalez RN.     0800- Pt complain of increased pain.  Dr Kemp notified.  Orders for Morphine and Pepcid.     0850- Pt transferred to Ultrasound.     0945- Pt returned from US.  EFM on.     1012- Pt complain of increased lower abdominal pain that radiates to back.  8/10. SVE 1/20/ballotable.   Dr Salguero notified.     1034- Morphine given per MD order.     1100- Pt states pain in lower abdomen has subsided.     1201- Dr Quinteros at bedside.  Strip reviewed.  Bedside US done per MD.     1345- Hospitalist PA at bedside.  Orders received.     1405- IV saline locked.  EFM off to transfer to CT.     1453- Pt back to room from CT. EFM on.  LR  infusing.  CHRIS Olivo and DSS at bedside.     1525- Dr Salguero notified of I&O.  Orders to decrease LR to 125cc/hr.     1630- Pt eating regular diet.  Pt denies nausea.  Pt states pain 5/10.

## 2024-02-08 PROBLEM — R10.9 ABDOMINAL PAIN DURING PREGNANCY IN THIRD TRIMESTER: Status: ACTIVE | Noted: 2024-02-08

## 2024-02-08 PROBLEM — O26.893 ABDOMINAL PAIN DURING PREGNANCY IN THIRD TRIMESTER: Status: ACTIVE | Noted: 2024-02-08

## 2024-02-08 LAB
ANION GAP SERPL CALC-SCNC: 5 MMOL/L (ref 2–11)
BUN SERPL-MCNC: 7 MG/DL (ref 6–23)
CALCIUM SERPL-MCNC: 7.8 MG/DL (ref 8.3–10.4)
CHLORIDE SERPL-SCNC: 109 MMOL/L (ref 103–113)
CO2 SERPL-SCNC: 24 MMOL/L (ref 21–32)
CREAT SERPL-MCNC: 0.92 MG/DL (ref 0.6–1)
ERYTHROCYTE [DISTWIDTH] IN BLOOD BY AUTOMATED COUNT: 13.6 % (ref 11.9–14.6)
GLUCOSE SERPL-MCNC: 101 MG/DL (ref 65–100)
HCT VFR BLD AUTO: 33.2 % (ref 35.8–46.3)
HGB BLD-MCNC: 10.2 G/DL (ref 11.7–15.4)
LIPASE SERPL-CCNC: 131 U/L (ref 73–393)
MCH RBC QN AUTO: 27.6 PG (ref 26.1–32.9)
MCHC RBC AUTO-ENTMCNC: 30.7 G/DL (ref 31.4–35)
MCV RBC AUTO: 90 FL (ref 82–102)
NRBC # BLD: 0 K/UL (ref 0–0.2)
PLATELET # BLD AUTO: 234 K/UL (ref 150–450)
PMV BLD AUTO: 11.2 FL (ref 9.4–12.3)
POTASSIUM SERPL-SCNC: 3.8 MMOL/L (ref 3.5–5.1)
RBC # BLD AUTO: 3.69 M/UL (ref 4.05–5.2)
SODIUM SERPL-SCNC: 138 MMOL/L (ref 136–146)
WBC # BLD AUTO: 7.2 K/UL (ref 4.3–11.1)

## 2024-02-08 PROCEDURE — A4216 STERILE WATER/SALINE, 10 ML: HCPCS | Performed by: OBSTETRICS & GYNECOLOGY

## 2024-02-08 PROCEDURE — 6370000000 HC RX 637 (ALT 250 FOR IP): Performed by: OBSTETRICS & GYNECOLOGY

## 2024-02-08 PROCEDURE — 59025 FETAL NON-STRESS TEST: CPT | Performed by: OBSTETRICS & GYNECOLOGY

## 2024-02-08 PROCEDURE — 80048 BASIC METABOLIC PNL TOTAL CA: CPT

## 2024-02-08 PROCEDURE — 2580000003 HC RX 258: Performed by: OBSTETRICS & GYNECOLOGY

## 2024-02-08 PROCEDURE — 6370000000 HC RX 637 (ALT 250 FOR IP): Performed by: NURSE PRACTITIONER

## 2024-02-08 PROCEDURE — 1100000000 HC RM PRIVATE

## 2024-02-08 PROCEDURE — 6360000002 HC RX W HCPCS: Performed by: OBSTETRICS & GYNECOLOGY

## 2024-02-08 PROCEDURE — 99449 NTRPROF PH1/NTRNET/EHR 31/>: CPT | Performed by: OBSTETRICS & GYNECOLOGY

## 2024-02-08 PROCEDURE — 36415 COLL VENOUS BLD VENIPUNCTURE: CPT

## 2024-02-08 PROCEDURE — 85027 COMPLETE CBC AUTOMATED: CPT

## 2024-02-08 PROCEDURE — 99232 SBSQ HOSP IP/OBS MODERATE 35: CPT | Performed by: OBSTETRICS & GYNECOLOGY

## 2024-02-08 PROCEDURE — 83690 ASSAY OF LIPASE: CPT

## 2024-02-08 PROCEDURE — 2580000003 HC RX 258: Performed by: NURSE PRACTITIONER

## 2024-02-08 PROCEDURE — 2500000003 HC RX 250 WO HCPCS: Performed by: OBSTETRICS & GYNECOLOGY

## 2024-02-08 RX ORDER — MORPHINE SULFATE 2 MG/ML
2 INJECTION, SOLUTION INTRAMUSCULAR; INTRAVENOUS
Status: DISCONTINUED | OUTPATIENT
Start: 2024-02-08 | End: 2024-02-08 | Stop reason: DRUGHIGH

## 2024-02-08 RX ORDER — LANOLIN ALCOHOL/MO/W.PET/CERES
400 CREAM (GRAM) TOPICAL 2 TIMES DAILY
Status: DISCONTINUED | OUTPATIENT
Start: 2024-02-08 | End: 2024-02-10 | Stop reason: HOSPADM

## 2024-02-08 RX ORDER — DOCUSATE SODIUM 100 MG/1
100 CAPSULE, LIQUID FILLED ORAL 2 TIMES DAILY
Status: DISCONTINUED | OUTPATIENT
Start: 2024-02-08 | End: 2024-02-10 | Stop reason: HOSPADM

## 2024-02-08 RX ORDER — POLYETHYLENE GLYCOL 3350 17 G/17G
17 POWDER, FOR SOLUTION ORAL 2 TIMES DAILY
Status: DISCONTINUED | OUTPATIENT
Start: 2024-02-08 | End: 2024-02-09

## 2024-02-08 RX ORDER — BISACODYL 10 MG
10 SUPPOSITORY, RECTAL RECTAL DAILY
Status: COMPLETED | OUTPATIENT
Start: 2024-02-08 | End: 2024-02-08

## 2024-02-08 RX ORDER — MORPHINE SULFATE 2 MG/ML
1 INJECTION, SOLUTION INTRAMUSCULAR; INTRAVENOUS
Status: DISCONTINUED | OUTPATIENT
Start: 2024-02-08 | End: 2024-02-09

## 2024-02-08 RX ORDER — BISACODYL 5 MG/1
5 TABLET, DELAYED RELEASE ORAL DAILY PRN
Status: DISPENSED | OUTPATIENT
Start: 2024-02-08 | End: 2024-02-09

## 2024-02-08 RX ORDER — CITRIC ACID/SODIUM CITRATE 334-500MG
30 SOLUTION, ORAL ORAL ONCE
Status: DISCONTINUED | OUTPATIENT
Start: 2024-02-08 | End: 2024-02-08 | Stop reason: CLARIF

## 2024-02-08 RX ORDER — BISACODYL 5 MG/1
10 TABLET, DELAYED RELEASE ORAL DAILY PRN
Status: DISCONTINUED | OUTPATIENT
Start: 2024-02-09 | End: 2024-02-10 | Stop reason: HOSPADM

## 2024-02-08 RX ORDER — MORPHINE SULFATE 2 MG/ML
2 INJECTION, SOLUTION INTRAMUSCULAR; INTRAVENOUS
Status: DISCONTINUED | OUTPATIENT
Start: 2024-02-08 | End: 2024-02-09

## 2024-02-08 RX ORDER — DOCUSATE SODIUM 100 MG/1
100 CAPSULE, LIQUID FILLED ORAL DAILY
Status: DISCONTINUED | OUTPATIENT
Start: 2024-02-08 | End: 2024-02-08

## 2024-02-08 RX ORDER — METOCLOPRAMIDE HYDROCHLORIDE 5 MG/ML
10 INJECTION INTRAMUSCULAR; INTRAVENOUS EVERY 4 HOURS PRN
Status: DISCONTINUED | OUTPATIENT
Start: 2024-02-08 | End: 2024-02-10 | Stop reason: HOSPADM

## 2024-02-08 RX ORDER — HYDROCORTISONE 25 MG/G
CREAM TOPICAL 3 TIMES DAILY PRN
Status: DISCONTINUED | OUTPATIENT
Start: 2024-02-08 | End: 2024-02-10 | Stop reason: HOSPADM

## 2024-02-08 RX ADMIN — FAMOTIDINE 20 MG: 10 INJECTION, SOLUTION INTRAVENOUS at 08:47

## 2024-02-08 RX ADMIN — MORPHINE SULFATE 2 MG: 2 INJECTION, SOLUTION INTRAMUSCULAR; INTRAVENOUS at 03:57

## 2024-02-08 RX ADMIN — MORPHINE SULFATE 1 MG: 2 INJECTION, SOLUTION INTRAMUSCULAR; INTRAVENOUS at 17:19

## 2024-02-08 RX ADMIN — MORPHINE SULFATE 2 MG: 2 INJECTION, SOLUTION INTRAMUSCULAR; INTRAVENOUS at 00:33

## 2024-02-08 RX ADMIN — SODIUM CHLORIDE, POTASSIUM CHLORIDE, SODIUM LACTATE AND CALCIUM CHLORIDE: 600; 310; 30; 20 INJECTION, SOLUTION INTRAVENOUS at 00:52

## 2024-02-08 RX ADMIN — PANTOPRAZOLE SODIUM 40 MG: 40 TABLET, DELAYED RELEASE ORAL at 06:27

## 2024-02-08 RX ADMIN — DOCUSATE SODIUM 100 MG: 100 CAPSULE, LIQUID FILLED ORAL at 20:26

## 2024-02-08 RX ADMIN — HYDROCORTISONE: 25 CREAM TOPICAL at 12:03

## 2024-02-08 RX ADMIN — BISACODYL 10 MG: 10 SUPPOSITORY RECTAL at 10:52

## 2024-02-08 RX ADMIN — Medication 400 MG: at 08:50

## 2024-02-08 RX ADMIN — WITCH HAZEL: 500 SOLUTION RECTAL; TOPICAL at 12:03

## 2024-02-08 RX ADMIN — ACETAMINOPHEN 650 MG: 325 TABLET, FILM COATED ORAL at 02:18

## 2024-02-08 RX ADMIN — MAGNESIUM SULFATE 1 G: 1 CRYSTAL ORAL; TOPICAL at 11:57

## 2024-02-08 RX ADMIN — SODIUM CHLORIDE, PRESERVATIVE FREE 10 ML: 5 INJECTION INTRAVENOUS at 20:26

## 2024-02-08 RX ADMIN — WITCH HAZEL: 500 SOLUTION RECTAL; TOPICAL at 15:53

## 2024-02-08 RX ADMIN — HYDROCORTISONE: 25 CREAM TOPICAL at 15:54

## 2024-02-08 RX ADMIN — POLYETHYLENE GLYCOL 3350 17 G: 17 POWDER, FOR SOLUTION ORAL at 20:26

## 2024-02-08 RX ADMIN — MORPHINE SULFATE 2 MG: 2 INJECTION, SOLUTION INTRAMUSCULAR; INTRAVENOUS at 06:27

## 2024-02-08 RX ADMIN — DOCUSATE SODIUM 100 MG: 100 CAPSULE, LIQUID FILLED ORAL at 09:46

## 2024-02-08 RX ADMIN — SODIUM CHLORIDE, POTASSIUM CHLORIDE, SODIUM LACTATE AND CALCIUM CHLORIDE: 600; 310; 30; 20 INJECTION, SOLUTION INTRAVENOUS at 09:02

## 2024-02-08 RX ADMIN — ENOXAPARIN SODIUM 40 MG: 100 INJECTION SUBCUTANEOUS at 08:55

## 2024-02-08 RX ADMIN — MORPHINE SULFATE 2 MG: 2 INJECTION, SOLUTION INTRAMUSCULAR; INTRAVENOUS at 08:52

## 2024-02-08 RX ADMIN — POLYETHYLENE GLYCOL 3350 17 G: 17 POWDER, FOR SOLUTION ORAL at 08:50

## 2024-02-08 RX ADMIN — ALUMINUM HYDROXIDE, MAGNESIUM HYDROXIDE, DIMETHICONE 30 ML: 200; 200; 20 LIQUID ORAL at 09:47

## 2024-02-08 RX ADMIN — MAGNESIUM SULFATE 1 G: 1 CRYSTAL ORAL; TOPICAL at 15:52

## 2024-02-08 RX ADMIN — PANTOPRAZOLE SODIUM 40 MG: 40 TABLET, DELAYED RELEASE ORAL at 16:06

## 2024-02-08 RX ADMIN — MORPHINE SULFATE 1 MG: 2 INJECTION, SOLUTION INTRAMUSCULAR; INTRAVENOUS at 14:10

## 2024-02-08 RX ADMIN — Medication 400 MG: at 20:26

## 2024-02-08 RX ADMIN — BISACODYL 5 MG: 5 TABLET, COATED ORAL at 17:13

## 2024-02-08 RX ADMIN — FAMOTIDINE 20 MG: 10 INJECTION, SOLUTION INTRAVENOUS at 20:26

## 2024-02-08 ASSESSMENT — PAIN DESCRIPTION - LOCATION
LOCATION: ABDOMEN

## 2024-02-08 ASSESSMENT — PAIN SCALES - GENERAL
PAINLEVEL_OUTOF10: 5
PAINLEVEL_OUTOF10: 6
PAINLEVEL_OUTOF10: 5
PAINLEVEL_OUTOF10: 6
PAINLEVEL_OUTOF10: 6
PAINLEVEL_OUTOF10: 5

## 2024-02-08 ASSESSMENT — PAIN DESCRIPTION - DESCRIPTORS
DESCRIPTORS: ACHING;DISCOMFORT;TIGHTNESS;TENDER
DESCRIPTORS: DULL
DESCRIPTORS: STABBING
DESCRIPTORS: DULL
DESCRIPTORS: ACHING;TIGHTNESS;TENDER

## 2024-02-08 ASSESSMENT — PAIN DESCRIPTION - ORIENTATION
ORIENTATION: LEFT;UPPER;OUTER

## 2024-02-08 NOTE — PROGRESS NOTES
1450 Patient agreed to enema milk and molasses . Patient tolerated procedure well. Education provided to patient, questions answered.  Bowel movement pending.   Patient returned call   Scheduled for 3/21/23 at [facility] Only    Program: [address] in place: No  Recommendation Provided To: Patient/Caregiver: 1 via Telephone  Intervention Detail: Adherence Monitorin  Intervention Accepted By: Patient/Caregiver: 1  Gap Closed?: Yes  Time Spent (min): 15

## 2024-02-08 NOTE — PROGRESS NOTES
Ante Partum High Risk Pregnancy Note  Kelsi Velazco  [id number]    Patient admitted for abdominal pain and elevated lipase, ?acute pancreatitis.  Has had severe constipation- which may be the actual cause for her pain.   Has begun tolerating food better and requiring less narcotics.  Had a BM this am. Per nurse, it was soft and a good amt.  Per pt, it was \"small\". Her abd pain is better. She c/o being tired, getting dizzy when she ambulates. And abd is crampy. Decreased FM.       Vitals:  Patient Vitals for the past 24 hrs:   BP Temp Temp src Pulse Resp SpO2   24 1320 134/69 98.5 °F (36.9 °C) Oral 84 16 100 %   24 1147 119/71 98.4 °F (36.9 °C) Oral 96 16 98 %   24 0803 129/60 98 °F (36.7 °C) Oral 90 16 100 %   24 111/74 98.7 °F (37.1 °C) Oral 75 16 100 %   24 1539 (!) 113/56 98.9 °F (37.2 °C) Oral 80 16 99 %     Temp (24hrs), Av.5 °F (36.9 °C), Min:98 °F (36.7 °C), Max:98.9 °F (37.2 °C)    I&O:  No intake/output data recorded.             1901 -  0700  In: 2520.7 [I.V.:2520.7]  Out: 3600 [Urine:3600]    Exam:  Patient appears groggy.               Abdomen soft, moderately distended, globally TTP. No peritoneal signs.                       Lower extremities edema 2+ feet and shins                   Labs:   Recent Results (from the past 24 hour(s))   Comprehensive Metabolic Panel w/ Reflex to MG    Collection Time: 24  5:27 AM   Result Value Ref Range    Sodium 139 136 - 146 mmol/L    Potassium 3.6 3.5 - 5.1 mmol/L    Chloride 112 103 - 113 mmol/L    CO2 23 21 - 32 mmol/L    Anion Gap 4 2 - 11 mmol/L    Glucose 99 65 - 100 mg/dL    BUN 5 (L) 6 - 23 MG/DL    Creatinine 0.79 0.6 - 1.0 MG/DL    Est, Glom Filt Rate >60 >60 ml/min/1.73m2    Calcium 7.8 (L) 8.3 - 10.4 MG/DL    Total Bilirubin 0.3 0.2 - 1.1 MG/DL    ALT 24 12 - 65 U/L    AST 24 15 - 37 U/L    Alk Phosphatase 162 (H) 50 - 130 U/L    Total Protein 5.3 (L) 6.3 - 8.2 g/dL    Albumin 1.7 (L) 3.5 - 5.0 g/dL

## 2024-02-08 NOTE — PROGRESS NOTES
MFM    Chart review and interdisciplinary rounding.       I have discussed it with OB and RN staff throughout the day.     No active evidence of pancreatitis. Unclear why lipase was so significantly elevated on admission with rapid resolution.     Pt with significant constipation and stool into small bowel.     I have worked with team to develop plans to treat constipation both proximally and distally. As well as m inimizing medications which may worsen constipation.     If no improvement in status by morning, plan another enema.     Nst bid.   Ambulate. Shower.     Monitor cmp and electrolytes. Today even though on ivf, serum creatinine had elevated from prior. No evidence of other renal dz or preE at this time.     At this time no indication for pre-39wk delivery. However this could change.     Appreciate medicine team’s he lp in managing this patient.         Celia Lugo   Pt leaving via personal vehicle to home. Discharge instructions given. Pt voiced understanding. Copy of discharge and scripts with patient. Iv removed. CP and PE completed. No further needs at discharge. Pt leaving with all personal belonging.       Patient rolled out to vehicle in wheelchair by this RN

## 2024-02-08 NOTE — PROGRESS NOTES
AntePartum High Risk Pregnancy Note    Kelsi Velazco  36w5d    Hospital Day: HD    Patient admitted for severe abdominal pain, elevated lipase with concern for acute pancreatitis.     Overnight patient continued to require IV narcotics. Diet advanced yesterday and tolerating without nausea. This AM she reports continued pain. Initially did not want the suppositories ordered by Dr. Lugo this AM. She tells me her amphetamines were positive from taking Sudafed. This is different that what she told Dr. WALKER yesterday.     US with non-vislauzed pancrease. No bile duct dilation   CT scan:   FINDINGS:  Visualized thorax: Patulous esophagus with air-fluid level places the patient at  aspiration risk.     Liver: Normal in size and morphology.  No focal lesions.  Gallbladder/biliary: Normal gallbladder. No biliary dilatation.  Pancreas: Normal.  Spleen: Normal.  Adrenals: Normal.  Kidneys: No focal lesion or hydronephrosis. No stones.  Bladder: Normal.  Pelvic organs: Partially imaged gravid uterus.  Gastrointestinal: Dilated loops of large bowel with air-fluid levels.  Fecalization of loops of small bowel suggest delayed transit.  Peritoneum/retroperitoneum: Normal.  Lymph nodes: Normal.  Vessels: Normal.  Bones/Soft tissues: Normal.    IMPRESSION:  Noncontrast CT of the abdomen without evidence of pancreatitis. Ancillary  findings above    Vitals:  Patient Vitals for the past 24 hrs:   BP Temp Temp src Pulse Resp SpO2   02/08/24 0708 124/81 98.1 °F (36.7 °C) Oral 90 17 100 %   02/08/24 0627 -- -- -- -- 16 --   02/08/24 0357 -- -- -- -- 18 --   02/08/24 0221 131/79 97.8 °F (36.6 °C) Oral 84 16 98 %   02/08/24 0033 -- -- -- -- 18 --   02/08/24 0005 116/74 97.9 °F (36.6 °C) Oral 82 16 99 %   02/07/24 2015 -- -- -- -- 16 --   02/07/24 1930 112/63 97.7 °F (36.5 °C) Oral 86 18 100 %   02/07/24 1737 -- -- -- 87 -- 100 %   02/07/24 1533 127/81 97.4 °F (36.3 °C) Axillary 91 16 98 %   02/07/24 1203  for delivery at this time  - Hx of CS x 2 with plans for repeat if delivery is indicated   - Social work involved due to +UDS  - NST BID    Katie Mcarthur, DO

## 2024-02-08 NOTE — PROGRESS NOTES
Patient at this time declines/refuses enema milk and molasses. Discussed with patient reason for doctor ordering it to help elevate her constipation. Re-educated patient on her current medication regime and possible interactions from some of the medications on her bowels. At this time patient still states she does not want the enema milk and molasses nor the dulcolax suppository that was ordered earlier. Patient states she will think about it maybe and if she changes her mind she will let staff know. Will updated MD to make aware.

## 2024-02-08 NOTE — PROGRESS NOTES
Patient has now had Dulcolax suppository and milk of mag enema with no BM.   Patient eating and tolerating PO well without nausea.   Will given PO dose of Dulcolax with glycolax powder now. Patient to try to ambulated. Morphine cut down from 4mg > 2mg today and patient only took 1 mg with last dose.   Zofran stopped. Reglan ordered.     If no BM by morning will repeat enema.     IVFs stopped as patient is now taking in PO and RN reported worsening LE edema.   Lytes ordered for the AM.   If diarrhea occurs will replace fluids 1:1 with NNEKA Mcarthur DO

## 2024-02-08 NOTE — PROGRESS NOTES
Rounded on pt this evening - see consult note by Dr. Quinteros earlier today. Pt continues to require IV pain medication, but has not had any more n/v after advancing her diet today. Advised bland foods. Will repeat her CMP & lipase in the morning. Discussed her constipation which she admits is longstanding. Reports starting suppositories last week after appt with Dr. Mcarthur and has had diarrhea since that time. Given the appearance of her colon on imaging today, concern for potential stool impaction causing slow transit beyond what would be expected from pregnancy. Pt has had worsening reflux and reports burping excessive sulfur smelling burps. Suspect gas pains for at least part of her pain - discussed working to ambulate more tomorrow and if not producing a BM after advancing her diet and ambulating, may need an enema or at least an excess of miralax. Pt and her family at bedside expressed understanding. Baby has been reactive on fetal tracing throughout the day.

## 2024-02-08 NOTE — PROGRESS NOTES
Hospitalist Progress Note   Admit Date:  2024  3:40 AM   Name:  Kelsi Velazco   Age:  28 y.o.  Sex:  female  :  1995   MRN:  [id number]   Room:  [facility]/    Presenting Complaint: Abdominal Pain and Diarrhea     Reason(s) for Admission: Abdominal pain in pregnancy, antepartum [O26.899, R10.9]     Hospital Course:   Kelsi Velazco is a 28 y.o. female at 36W4D gestation with a PMHx significant for illicit drug abuse, JANET, GERD, depression (hx suicide attempt) who presented to the hospital c/o significant abdominal pains x 1 day.  Pt reports to diarrhea x 2 days with N/V.  She denies ETOH abuse, fatty meals, hx of cholelithiasis, ill contacts.       She was found with acute pancreatitis with lipase 2,002 and borderline elevated triglycerides of 214; LFT wnl.  Also concerning was UDS + amphetamine and opioids.  Obstetrics admitted patient; Hospitalist consulted for medical mgmt of acute pancreatitis.       Subjective & 24hr Events (24):   Was advanced to a GI bland diet yesterday, no further N/V. Intermittent abdominal pain. Abdominal US was unremarkable. Abdominal CT neg for pancreatitis. Gallbladder neg. Some gas pattern and constipation noted w slow motility. Has had some gerd as well, on PPI.     Lipase resolved to normal today --> 131, after IV flds. No fever. WBC ct 7.2, hgb 10.2 (pts baseline), cr and electrolytes all WNL. Etoh screen neg. UDS pos as stated above.     Pt resting in bed. 36w pregnant. Appears to have stabilized in both her symptoms, and by view of her VS/labs/imaging. All questions were answered at the bedside. Hospitalist will sign off at this time. Thank you for the consult and allowing us to participate in this pts care.     Assessment & Plan:   Principal Problem:    Acute pancreatitis  - US unable to visual pancrease due to gas and pregnancy, though no biliary ductal dilation and GB appears normal which is reassuring   - CT abdomen neg for pancreatitis. Some gas and  MOJGAN Ny CNP    Part of this note may have been written by using a voice dictation software.  The note has been proof read but may still contain some grammatical/other typographical errors.

## 2024-02-09 PROBLEM — K59.00 OBSTIPATION: Status: ACTIVE | Noted: 2024-02-09

## 2024-02-09 PROBLEM — O36.8130 DECREASED FETAL MOVEMENTS IN THIRD TRIMESTER: Status: ACTIVE | Noted: 2024-02-09

## 2024-02-09 LAB
ALBUMIN SERPL-MCNC: 1.7 G/DL (ref 3.5–5)
ALBUMIN/GLOB SERPL: 0.5 (ref 0.4–1.6)
ALP SERPL-CCNC: 162 U/L (ref 50–130)
ALT SERPL-CCNC: 24 U/L (ref 12–65)
ANION GAP SERPL CALC-SCNC: 4 MMOL/L (ref 2–11)
AST SERPL-CCNC: 24 U/L (ref 15–37)
BILIRUB SERPL-MCNC: 0.3 MG/DL (ref 0.2–1.1)
BUN SERPL-MCNC: 5 MG/DL (ref 6–23)
CALCIUM SERPL-MCNC: 7.8 MG/DL (ref 8.3–10.4)
CHLORIDE SERPL-SCNC: 112 MMOL/L (ref 103–113)
CO2 SERPL-SCNC: 23 MMOL/L (ref 21–32)
CREAT SERPL-MCNC: 0.79 MG/DL (ref 0.6–1)
ERYTHROCYTE [DISTWIDTH] IN BLOOD BY AUTOMATED COUNT: 13.3 % (ref 11.9–14.6)
GLOBULIN SER CALC-MCNC: 3.6 G/DL (ref 2.8–4.5)
GLUCOSE BLD STRIP.AUTO-MCNC: 91 MG/DL (ref 65–100)
GLUCOSE SERPL-MCNC: 99 MG/DL (ref 65–100)
HCT VFR BLD AUTO: 29.3 % (ref 35.8–46.3)
HGB BLD-MCNC: 9.5 G/DL (ref 11.7–15.4)
MCH RBC QN AUTO: 27.4 PG (ref 26.1–32.9)
MCHC RBC AUTO-ENTMCNC: 32.4 G/DL (ref 31.4–35)
MCV RBC AUTO: 84.4 FL (ref 82–102)
NRBC # BLD: 0 K/UL (ref 0–0.2)
PLATELET # BLD AUTO: 212 K/UL (ref 150–450)
PMV BLD AUTO: 10.5 FL (ref 9.4–12.3)
POTASSIUM SERPL-SCNC: 3.6 MMOL/L (ref 3.5–5.1)
PROT SERPL-MCNC: 5.3 G/DL (ref 6.3–8.2)
RBC # BLD AUTO: 3.47 M/UL (ref 4.05–5.2)
SERVICE CMNT-IMP: NORMAL
SODIUM SERPL-SCNC: 139 MMOL/L (ref 136–146)
WBC # BLD AUTO: 6 K/UL (ref 4.3–11.1)

## 2024-02-09 PROCEDURE — 6370000000 HC RX 637 (ALT 250 FOR IP): Performed by: OBSTETRICS & GYNECOLOGY

## 2024-02-09 PROCEDURE — 80053 COMPREHEN METABOLIC PANEL: CPT

## 2024-02-09 PROCEDURE — 36415 COLL VENOUS BLD VENIPUNCTURE: CPT

## 2024-02-09 PROCEDURE — 2580000003 HC RX 258: Performed by: OBSTETRICS & GYNECOLOGY

## 2024-02-09 PROCEDURE — 1100000000 HC RM PRIVATE

## 2024-02-09 PROCEDURE — 82962 GLUCOSE BLOOD TEST: CPT

## 2024-02-09 PROCEDURE — 6360000002 HC RX W HCPCS: Performed by: OBSTETRICS & GYNECOLOGY

## 2024-02-09 PROCEDURE — 85027 COMPLETE CBC AUTOMATED: CPT

## 2024-02-09 PROCEDURE — 99232 SBSQ HOSP IP/OBS MODERATE 35: CPT | Performed by: OBSTETRICS & GYNECOLOGY

## 2024-02-09 PROCEDURE — 59025 FETAL NON-STRESS TEST: CPT | Performed by: OBSTETRICS & GYNECOLOGY

## 2024-02-09 RX ORDER — POLYETHYLENE GLYCOL 3350 17 G/17G
17 POWDER, FOR SOLUTION ORAL 4 TIMES DAILY
Status: DISCONTINUED | OUTPATIENT
Start: 2024-02-09 | End: 2024-02-10 | Stop reason: HOSPADM

## 2024-02-09 RX ORDER — FAMOTIDINE 20 MG/1
20 TABLET, FILM COATED ORAL 2 TIMES DAILY
Status: DISCONTINUED | OUTPATIENT
Start: 2024-02-09 | End: 2024-02-10 | Stop reason: HOSPADM

## 2024-02-09 RX ADMIN — DOCUSATE SODIUM 100 MG: 100 CAPSULE, LIQUID FILLED ORAL at 08:52

## 2024-02-09 RX ADMIN — Medication 400 MG: at 08:52

## 2024-02-09 RX ADMIN — PANTOPRAZOLE SODIUM 40 MG: 40 TABLET, DELAYED RELEASE ORAL at 05:32

## 2024-02-09 RX ADMIN — FAMOTIDINE 20 MG: 20 TABLET ORAL at 08:53

## 2024-02-09 RX ADMIN — POLYETHYLENE GLYCOL 3350 17 G: 17 POWDER, FOR SOLUTION ORAL at 08:53

## 2024-02-09 RX ADMIN — POLYETHYLENE GLYCOL 3350 17 G: 17 POWDER, FOR SOLUTION ORAL at 13:37

## 2024-02-09 RX ADMIN — SODIUM CHLORIDE, PRESERVATIVE FREE 5 ML: 5 INJECTION INTRAVENOUS at 16:42

## 2024-02-09 RX ADMIN — BISACODYL 10 MG: 5 TABLET, COATED ORAL at 09:45

## 2024-02-09 RX ADMIN — FAMOTIDINE 20 MG: 20 TABLET ORAL at 21:19

## 2024-02-09 RX ADMIN — DOCUSATE SODIUM 100 MG: 100 CAPSULE, LIQUID FILLED ORAL at 21:19

## 2024-02-09 RX ADMIN — POLYETHYLENE GLYCOL 3350 17 G: 17 POWDER, FOR SOLUTION ORAL at 17:07

## 2024-02-09 RX ADMIN — POLYETHYLENE GLYCOL 3350 17 G: 17 POWDER, FOR SOLUTION ORAL at 21:19

## 2024-02-09 RX ADMIN — IRON SUCROSE 300 MG: 20 INJECTION, SOLUTION INTRAVENOUS at 16:45

## 2024-02-09 RX ADMIN — SODIUM CHLORIDE, PRESERVATIVE FREE 5 ML: 5 INJECTION INTRAVENOUS at 08:56

## 2024-02-09 RX ADMIN — Medication 400 MG: at 21:19

## 2024-02-09 NOTE — PROGRESS NOTES
2/9/2024    Indication: decreased FM today  Time on: 9667-9785  Baseline: 135  Reactive within 20min? yes  Decels? no  Contractions: no

## 2024-02-09 NOTE — CONSULTS
Clinical Ethics Consultation Note    History and Context:  Patient desires to complete HCPOA designating her mother as her agent.     Principal Problem:    Abdominal pain in pregnancy, antepartum  Active Problems:    Rh negative status during pregnancy in third trimester    Depression affecting pregnancy in third trimester, antepartum    Previous  section complicating pregnancy    Cystic fibrosis carrier    Supervision of high risk pregnancy in third trimester    History of drug use    Anemia affecting pregnancy in third trimester    Acute pancreatitis    Abdominal pain during pregnancy in third trimester  Resolved Problems:    * No resolved hospital problems. *    Age: 28 y.o.  Gender: female  Gender Identity: female  Admitted Date: 2024  Consult Date: 24  MRN: [id number]  Valid Advanced Medical Directive: No    Process:  Ethics consulted by Chaplain Frausto    Conferred with Risk Management and Legal    Ethical Question(s) and Concern(s):  Can a pregnant patient complete a Health Care Power of     Analysis:  [place] Code of Laws Section 44-77-70 states \"If a declarant has been diagnosed as pregnant, the Declaration is not effective during the course of the declarant's pregnancy.\"     This statement refers to the \"Declaration of a Desire for Natural Death,\" which is a specific legal form for a person to express their desire to refuse life-sustaining medical treatment if they are diagnosed with a terminal condition or in a persistent vegetative state. This only applies to life-sustaining procedures used to prolong dying if a person is terminal or in a persistent vegetative state, and would not apply during a typical labor & delivery. The \"Declaration of a Desire for Natural Death\" is a type of advance directive, but it is not the same as a Health Care Power of  (HCPOA).     A pregnant patient may complete HCPOA paperwork to designate a surrogate decision maker in the event that  she loses decision-making capacity. Neither a pregnant patient nor her surrogate, however, may consent to withholding or withdrawing life sustaining treatment during pregnancy.    Recommendations:  Patient may complete HCPOA during pregnancy  Confirm that patient understands that this legal document will apply indefinitely, unless revoked, in any future situations where she does not have decision-making capacity and requires a surrogate to consent to medical treatment- not just for this hospitalization  Confirm that the patient understands that her HCPOA cannot consent to withholding or withdrawing life sustaining treatment, if she were to require such treatment during pregnancy    Closing:  Thank you for including ethics in this patient's care. Please reach out to me directly with any additional questions or concerns.    Val Farias MA, [facility]  System Ethics Lead  [phone]  VAL FARIAS  N/A    Primary Ethical Theme: Primary Ethical Themes: Clarify legal surrogate decision maker  Secondary Ethical Theme:

## 2024-02-09 NOTE — PROGRESS NOTES
02/09/24 1320   Maternal Vitals (MEW-T)   Temp 98.5 °F (36.9 °C)   Temp Source Oral   Pulse 84   Heart Rate Source Monitor   Respirations 16   /69   SpO2 100 %   Level of Consciousness Alert   MEW-T Score 0   Maternal Assessment Comments Patient called out to nurses station requesting nurse. Patient lying in bed. Mom at bedside. Patient states feels dizzy and light headed. Reports no pain states discomfort \"feels bloated\"   Pain Assessment   Pain Assessment None - Denies Pain   Fetal Heart Rate   Patient Feels Fetal Movement Yes     Patient states was seeing spots but now resolved. VSS. Blood sugar 91. Patient up to restroom and voided clear urine. Denies pain. Patient agrees to EFM monitoring now. Will continue to monitor.

## 2024-02-09 NOTE — PROGRESS NOTES
02/09/24 0943   Fetal Heart Rate   OB Interventions Comments Attempting to obtain NST, patient refuses states maybe later

## 2024-02-09 NOTE — PLAN OF CARE
Problem: Pain  Goal: Verbalizes/displays adequate comfort level or baseline comfort level  2/9/2024 1223 by Diana Lee RN  Outcome: Progressing  2/9/2024 0819 by Diana Lee RN  Outcome: Progressing  2/9/2024 0818 by Diana Lee RN  Outcome: Progressing  Flowsheets (Taken 2/9/2024 0803)  Verbalizes/displays adequate comfort level or baseline comfort level:   Encourage patient to monitor pain and request assistance   Assess pain using appropriate pain scale   Administer analgesics based on type and severity of pain and evaluate response   Implement non-pharmacological measures as appropriate and evaluate response   Notify Licensed Independent Practitioner if interventions unsuccessful or patient reports new pain   Consider cultural and social influences on pain and pain management     Problem: Safety - Adult  Goal: Free from fall injury  2/9/2024 1223 by Diana Lee RN  Outcome: Progressing  2/9/2024 0819 by Diana Lee RN  Outcome: Progressing  Flowsheets (Taken 2/9/2024 0803)  Free From Fall Injury:   Instruct family/caregiver on patient safety   Based on caregiver fall risk screen, instruct family/caregiver to ask for assistance with transferring infant if caregiver noted to have fall risk factors     Problem: Discharge Planning  Goal: Discharge to home or other facility with appropriate resources  2/9/2024 1223 by Diana Lee RN  Outcome: Progressing  2/9/2024 0819 by Diana Lee RN  Outcome: Progressing  Flowsheets (Taken 2/9/2024 0803)  Discharge to home or other facility with appropriate resources:   Identify barriers to discharge with patient and caregiver   Arrange for needed discharge resources and transportation as appropriate   Identify discharge learning needs (meds, wound care, etc)   Refer to discharge planning if patient needs post-hospital services based on physician order or complex needs related to functional status, cognitive ability or social support system

## 2024-02-09 NOTE — PLAN OF CARE
Problem: Pain  Goal: Verbalizes/displays adequate comfort level or baseline comfort level  2/9/2024 0819 by Diana Lee, RN  Outcome: Progressing  2/9/2024 0818 by Diana Lee RN  Outcome: Progressing  Flowsheets (Taken 2/9/2024 0803)  Verbalizes/displays adequate comfort level or baseline comfort level:   Encourage patient to monitor pain and request assistance   Assess pain using appropriate pain scale   Administer analgesics based on type and severity of pain and evaluate response   Implement non-pharmacological measures as appropriate and evaluate response   Notify Licensed Independent Practitioner if interventions unsuccessful or patient reports new pain   Consider cultural and social influences on pain and pain management  2/8/2024 2110 by Adamaris Tristan RN  Outcome: Progressing     Problem: Safety - Adult  Goal: Free from fall injury  Outcome: Progressing  Flowsheets (Taken 2/9/2024 0803)  Free From Fall Injury:   Instruct family/caregiver on patient safety   Based on caregiver fall risk screen, instruct family/caregiver to ask for assistance with transferring infant if caregiver noted to have fall risk factors     Problem: Discharge Planning  Goal: Discharge to home or other facility with appropriate resources  Outcome: Progressing  Flowsheets (Taken 2/9/2024 0803)  Discharge to home or other facility with appropriate resources:   Identify barriers to discharge with patient and caregiver   Arrange for needed discharge resources and transportation as appropriate   Identify discharge learning needs (meds, wound care, etc)   Refer to discharge planning if patient needs post-hospital services based on physician order or complex needs related to functional status, cognitive ability or social support system

## 2024-02-09 NOTE — PROGRESS NOTES
Received request for HCPOA for patient.  Patient is pregnant.  I have been told in the past that I could not assist patient's who are pregnant with completion of HCPOA.  I talked with patient and her mother and left HCPOA form.  I offered to come back after delivery to assist her in completing HCPOA.  For clarification I have talked with Jesus Wright who will get back with me.      Lisbet Block M.Div.

## 2024-02-09 NOTE — CARE COORDINATION
SW consult received.      1240:  SW made contact with  who is aware of patient's request to complete HCPOA.    1320:  SW attempted to meet with patient.  Patient's mother requested that  come back later as patient was not feeling well.     1420:  SW attempted to meet with patient.  As  was speaking with patient, she got out of bed, went into the bathroom, and closed the door.  SW will come back later.    1440:  SW spoke with  who is agreeable to come to patient's room to complete HCPOA.    1500:  Patient agreeable to speak with .  Patient confirms that she completed document making her mother (Adamaris Luna) her HCPOA.  Per patient, her  (Jason Velazco) has not been to the hospital since she was admitted, and \"he is hard to get a hold of.\"  Patient denies any concerns about her safety/wellbeing at this time as \"he () probably isn't going to come up here.\"    1520:  Phone call to Yuly Woo with DSS ([phone]) to inform that patient is still in the hospital.   relayed patient's request to speak with DSS again.  Yuly states that she will come to the hospital this afternoon to meet with patient.    ZENAIDA Chatterjee-SARAH, [facility]  [facility]   [phone]
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
## 2024-02-09 NOTE — PROGRESS NOTES
Advance Care Planning     Advance Care Planning Inpatient Note  [facility] Department    Today's Date: 2/9/2024  Unit: [facility] 4 LABOR & DELIVERY    Received request from patient.  Upon review of chart and communication with care team, patient's decision making abilities are not in question.. Patient was/were present in the room during visit.    Goals of ACP Conversation:  Discuss advance care planning documents    Health Care Decision Makers:     No healthcare decision makers have been documented.  Click here to complete HealthCare Decision Makers including selection of the Healthcare Decision Maker Relationship (ie \"Primary\")  Summary:  Completed New Documents    Advance Care Planning Documents (Patient Wishes):  Healthcare Power of /Advance Directive Appointment of Health Care Agent     Assessment:  HCPOA referral received.  Assisted patient in completing HCPOA documents.  Copy placed on chart and original returned with additional copies.    Interventions:  Assisted in the completion of documents according to patient's wishes at this time    Care Preferences Communicated:   No    Outcomes/Plan:  ACP Discussion: Completed    Electronically signed by KHALIDA Tompkins on 2/9/2024 at 3:05 PM

## 2024-02-09 NOTE — PROGRESS NOTES
Patient requesting information to establish a health care proxy for herself. Patient states she is legally  but does not want any decision making to be made by her , Jason Velazco. Patient states she wants to establish her mom Adamaris Luna as her health care proxy.   Mrs. Luna number is [phone]. Staff advised patient to reach out to the patient relations dept at [facility] at [phone] for assistance with initing this paperwork. This staff did call patient relations and left a message informing them patient is currently hospitalized and seeking to establish a health care proxy for herself. A blank copy of the [facility] Power of  was provided. Patient was advised she may seek  for assistance filling out forms.   Social work consult will be placed and House Supervisor was made aware in event another individual besides patient relations could assist patient with her request.

## 2024-02-09 NOTE — PROGRESS NOTES
MFM    Pt resting during rounds.     Reassuring status.   Continue to work to relieve constipation. Once productive will plan dc home.     I have discussed plans with Dr. Romero and RN Diana Lee today.     Celia Lugo MD

## 2024-02-10 VITALS
TEMPERATURE: 98.1 F | SYSTOLIC BLOOD PRESSURE: 133 MMHG | HEART RATE: 106 BPM | OXYGEN SATURATION: 100 % | RESPIRATION RATE: 16 BRPM | DIASTOLIC BLOOD PRESSURE: 70 MMHG

## 2024-02-10 LAB
ERYTHROCYTE [DISTWIDTH] IN BLOOD BY AUTOMATED COUNT: 13.2 % (ref 11.9–14.6)
HCT VFR BLD AUTO: 30.2 % (ref 35.8–46.3)
HGB BLD-MCNC: 9.9 G/DL (ref 11.7–15.4)
MCH RBC QN AUTO: 27.7 PG (ref 26.1–32.9)
MCHC RBC AUTO-ENTMCNC: 32.8 G/DL (ref 31.4–35)
MCV RBC AUTO: 84.4 FL (ref 82–102)
NRBC # BLD: 0 K/UL (ref 0–0.2)
PLATELET # BLD AUTO: 195 K/UL (ref 150–450)
PMV BLD AUTO: 10.7 FL (ref 9.4–12.3)
RBC # BLD AUTO: 3.58 M/UL (ref 4.05–5.2)
WBC # BLD AUTO: 7.7 K/UL (ref 4.3–11.1)

## 2024-02-10 PROCEDURE — 6370000000 HC RX 637 (ALT 250 FOR IP): Performed by: OBSTETRICS & GYNECOLOGY

## 2024-02-10 PROCEDURE — 2580000003 HC RX 258: Performed by: OBSTETRICS & GYNECOLOGY

## 2024-02-10 PROCEDURE — 6360000002 HC RX W HCPCS: Performed by: OBSTETRICS & GYNECOLOGY

## 2024-02-10 PROCEDURE — 59025 FETAL NON-STRESS TEST: CPT | Performed by: OBSTETRICS & GYNECOLOGY

## 2024-02-10 PROCEDURE — 85027 COMPLETE CBC AUTOMATED: CPT

## 2024-02-10 PROCEDURE — 36415 COLL VENOUS BLD VENIPUNCTURE: CPT

## 2024-02-10 PROCEDURE — 99232 SBSQ HOSP IP/OBS MODERATE 35: CPT | Performed by: OBSTETRICS & GYNECOLOGY

## 2024-02-10 RX ORDER — PSEUDOEPHEDRINE HCL 30 MG
100 TABLET ORAL 2 TIMES DAILY
Qty: 60 CAPSULE | Refills: 5 | Status: SHIPPED | OUTPATIENT
Start: 2024-02-10

## 2024-02-10 RX ORDER — LANOLIN ALCOHOL/MO/W.PET/CERES
400 CREAM (GRAM) TOPICAL 2 TIMES DAILY
Qty: 60 TABLET | Refills: 5 | Status: SHIPPED | OUTPATIENT
Start: 2024-02-10

## 2024-02-10 RX ORDER — POLYETHYLENE GLYCOL 3350 17 G/17G
17 POWDER, FOR SOLUTION ORAL 4 TIMES DAILY
Qty: 120 PACKET | Refills: 5 | Status: SHIPPED | OUTPATIENT
Start: 2024-02-10 | End: 2024-03-11

## 2024-02-10 RX ORDER — HYDROCORTISONE 25 MG/G
CREAM TOPICAL
Qty: 28 G | Refills: 5 | Status: SHIPPED | OUTPATIENT
Start: 2024-02-10

## 2024-02-10 RX ORDER — PANTOPRAZOLE SODIUM 40 MG/1
40 TABLET, DELAYED RELEASE ORAL
Qty: 60 TABLET | Refills: 5 | Status: SHIPPED | OUTPATIENT
Start: 2024-02-11

## 2024-02-10 RX ORDER — FAMOTIDINE 20 MG/1
20 TABLET, FILM COATED ORAL 2 TIMES DAILY
Qty: 60 TABLET | Refills: 5 | Status: SHIPPED | OUTPATIENT
Start: 2024-02-10

## 2024-02-10 RX ADMIN — FAMOTIDINE 20 MG: 20 TABLET ORAL at 09:01

## 2024-02-10 RX ADMIN — HYDROCORTISONE: 25 CREAM TOPICAL at 10:20

## 2024-02-10 RX ADMIN — MAGNESIUM SULFATE 1 G: 1 CRYSTAL ORAL; TOPICAL at 19:07

## 2024-02-10 RX ADMIN — BISACODYL 10 MG: 5 TABLET, COATED ORAL at 10:33

## 2024-02-10 RX ADMIN — PANTOPRAZOLE SODIUM 40 MG: 40 TABLET, DELAYED RELEASE ORAL at 07:22

## 2024-02-10 RX ADMIN — SODIUM CHLORIDE, PRESERVATIVE FREE 5 ML: 5 INJECTION INTRAVENOUS at 09:03

## 2024-02-10 RX ADMIN — POLYETHYLENE GLYCOL 3350 17 G: 17 POWDER, FOR SOLUTION ORAL at 13:23

## 2024-02-10 RX ADMIN — Medication 400 MG: at 09:01

## 2024-02-10 RX ADMIN — PANTOPRAZOLE SODIUM 40 MG: 40 TABLET, DELAYED RELEASE ORAL at 16:41

## 2024-02-10 RX ADMIN — SODIUM CHLORIDE, PRESERVATIVE FREE 5 ML: 5 INJECTION INTRAVENOUS at 18:21

## 2024-02-10 RX ADMIN — HYDROCORTISONE: 25 CREAM TOPICAL at 19:08

## 2024-02-10 RX ADMIN — WITCH HAZEL: 500 SOLUTION RECTAL; TOPICAL at 07:22

## 2024-02-10 RX ADMIN — POLYETHYLENE GLYCOL 3350 17 G: 17 POWDER, FOR SOLUTION ORAL at 16:41

## 2024-02-10 RX ADMIN — DOCUSATE SODIUM 100 MG: 100 CAPSULE, LIQUID FILLED ORAL at 09:01

## 2024-02-10 RX ADMIN — MAGNESIUM SULFATE 1 G: 1 CRYSTAL ORAL; TOPICAL at 10:19

## 2024-02-10 RX ADMIN — WITCH HAZEL: 500 SOLUTION RECTAL; TOPICAL at 19:08

## 2024-02-10 RX ADMIN — POLYETHYLENE GLYCOL 3350 17 G: 17 POWDER, FOR SOLUTION ORAL at 09:01

## 2024-02-10 RX ADMIN — IRON SUCROSE 300 MG: 20 INJECTION, SOLUTION INTRAVENOUS at 16:49

## 2024-02-10 RX ADMIN — SODIUM CHLORIDE, PRESERVATIVE FREE 5 ML: 5 INJECTION INTRAVENOUS at 16:48

## 2024-02-10 NOTE — PROGRESS NOTES
2/10/2024    Indication: abdominal pain 37wks pregnancy  Time on: 7310-9187  Baseline: 130  Reactive within 20min? yes  Decels? none  Contractions: irritability

## 2024-02-10 NOTE — PROGRESS NOTES
Ante Partum High Risk Pregnancy Note  Kelsi Velazco  [id number]    See prior notes. Patient had another small bm yday. Nothing so far this am- except for some liquid from the SS enema.  She reports she feels a little better today. Not as dizzy with being up.  Passing some flatus  Currently noting pressure and cramping in RLQ      Vitals:  Patient Vitals for the past 24 hrs:   BP Temp Temp src Pulse Resp SpO2   02/10/24 0718 (!) 114/58 98 °F (36.7 °C) Axillary 90 16 100 %   02/10/24 0607 -- -- -- -- 16 --   02/10/24 0400 -- -- -- -- 16 --   24 2150 136/82 98 °F (36.7 °C) Oral 80 16 99 %   24 175 -- -- -- -- -- 94 %   24 174 -- -- -- -- -- 100 %   24 174 (!) 144/67 -- -- 95 -- --   24 -- -- -- 84 -- (!) 76 %   24 -- -- -- 72 -- 100 %   24 115/73 -- -- 94 -- --   24 -- -- -- 78 -- 100 %   24 -- -- -- 75 -- 100 %   24 -- -- -- 74 -- 100 %   24 126/62 -- -- 81 -- --   24 -- -- -- 85 -- 100 %   24 -- -- -- 92 -- 100 %   24 -- -- -- 77 -- 100 %   24 170 (!) 147/65 -- -- (!) 102 16 100 %   24 -- -- -- 93 -- 100 %   24 -- -- -- 89 -- 100 %   24 164 -- -- -- 91 -- 99 %   24 1641 128/77 98.7 °F (37.1 °C) -- 88 16 99 %   24 1545 109/67 98.6 °F (37 °C) Oral 89 -- 100 %   24 1320 134/69 98.5 °F (36.9 °C) Oral 84 16 100 %     Temp (24hrs), Av.4 °F (36.9 °C), Min:98 °F (36.7 °C), Max:98.7 °F (37.1 °C)    I&O:  No intake/output data recorded.             1901 - 02/10 0700  In: 10 [I.V.:10]  Out: -     Exam:  Patient has showered. Is up in a chair. Looks better than yday               Abdomen soft, and less TTP than yday  LEs -her edema is also lessened. Now 1+ of feet and shins.                   Labs:   Recent Results (from the past 24 hour(s))   POCT Glucose    Collection Time: 24  1:25 PM   Result Value Ref Range    POC

## 2024-02-10 NOTE — PROGRESS NOTES
This staff took patient outside for a walk and to obtain some fresh air. Order was received from Dr Nick loja.  Patient appreciative.

## 2024-02-10 NOTE — PROGRESS NOTES
Dr Romero OBMD placed GI consult for additional bowel management support d/t unresolved severe constipation despite PO, suppository and enemas.    This staff made contact through perfect serve to provider. Spoke with Dr Ramirez (GI). Options include manual disimpaction or colon prep per MD. Provider states possible delivery maybe necessary. Dr Romero's phone number provided to Dr Ramirez through perfect serve per Dr Ramirez's request.

## 2024-02-11 NOTE — DISCHARGE SUMMARY
Antepartum Discharge Summary     Name: Kelsi Velazco MRN: [id number]  SSN: [id number]    YOB: 1995  Age: 28 y.o.  Sex: female      Allergies: Tramadol    Admit Date: 2/7/2024    Discharge Date: 2/10/2024     Admitting Physician: Dominic Payne MD     Attending Physician:  Pauline Reyes MD     * Admission Diagnoses: Abdominal pain in pregnancy, antepartum [O26.899, R10.9]    * Discharge Diagnoses:    Lab Results   Component Value Date/Time    ABORH A NEGATIVE 07/26/2023 02:19 PM      Immunization History   Administered Date(s) Administered    DTaP vaccine 06/20/2011    Rho (D) Immune Globulin 07/05/2013, 02/21/2021    Rho(d) Immune Globulin- Iv Or Im 07/05/2013    TDaP, ADACEL (age 10y-64y), BOOSTRIX (age 10y+), IM, 0.5mL 07/08/2013       * Discharge Condition: Improved    * Procedures: IV hydration, GI consult, MFM consult, social work consult (UDS + for opioids and amphetamines on adm), CT of abdomen, US of abdomen, IV iron, electrolyte replacement.      * Hospital Course:    Pt admitted at 36+ wks with abd pain, n/v/d, elevated lipase. Dx by IM with acute pancreatitis. This dx questioned by M.   Lipase quickly returned to NL. Then she required less zofran and morphine. Diet was advanced.   Pt began to agree to supp's and enemas for help with severe constipation. With results, her pain resolved.  Fetal monitoring reassuring throughout her stay.  Pt anemic and Fe deficient. Received venofer 300mg iv x2. With this and time, pt also was able to ambulate with less dizziness, that had resolved at d/c.     * Disposition: Home    Discharge Medications:      Medication List        START taking these medications      famotidine 20 MG tablet  Commonly known as: PEPCID  Take 1 tablet by mouth 2 times daily     hydrocortisone 2.5 % Crea rectal cream  Commonly known as: ANUSOL-HC  Apply to anus tid prn pain     magnesium oxide 400 (240 Mg) MG tablet  Commonly known as: MAG-OX  Take 1 tablet by mouth

## 2024-02-11 NOTE — PROGRESS NOTES
Reactive NST, see flowsheet. Patient discharged home in the care of her mother at this time. Discharge instructions reviewed and patient given opportunity for questions. No questions at this time. Return to [facility] with VB more than spotting, LOF, DFM and or painful regular contractions.

## 2024-02-11 NOTE — PROGRESS NOTES
Brief GI Note    We were consulted on 2/10/24 for evaluation of severe constipation with no results. On phone discussion with MD and RN, I suggested trialing a Golytely prep in conjunction with rectal suppository or enema and patient would be seen today 2/11/24    I had also suggested additional imaging to ensure the fetus (baby) was not pushign up against the colon given patient is full term (37 weeks).    Interestingly it seems that patient felt better and was discharged. GI was not informed.    Baldev Ramirez MD  Gastroenterology and Interventional Endoscopy

## 2024-02-11 NOTE — PROGRESS NOTES
I spoke with GI on call mid to late afternoon about what else could be done for pt  He indicated he would look through her meds, talk with pharmacy, and enter a note on what else can be added.  I don't have a note from him yet  But in the meantime, pt has really improved in how she feels. No longer dizzy. Did not have results after another SS enema this evening, but she is no longer uncomfortable.    \/70   Pulse (!) 106   Temp 98.1 °F (36.7 °C) (Oral)   Resp 16   LMP 05/27/2023 (Approximate)   SpO2 100%     Abd is soft, ND, NT to palp throughout  Cx TFT/th/high and ballotable  No stool palpated in rectum    Pt asked about going home. She looks and feels so much better now, I think that is safe.  Will send to her pharmacy the meds she is on here. No po fe- not needed b/c she got venofer here x2  Told her to use gummy pnv that don't have fe  Can still do the miralax qid, mag oxide bid, colace bid at home.  Call to get office appt for next wk.

## 2024-02-12 NOTE — CARE COORDINATION
Yuly Woo with DSS ([phone]) notified that patient discharged on 2/10/24.    ZENAIDA Chatterjee-SARAH, PMH-C  [facility]   [phone]

## 2024-02-13 ENCOUNTER — ROUTINE PRENATAL (OUTPATIENT)
Dept: OBGYN CLINIC | Age: 29
End: 2024-02-13
Payer: MEDICAID

## 2024-02-13 VITALS — SYSTOLIC BLOOD PRESSURE: 120 MMHG | BODY MASS INDEX: 32.42 KG/M2 | DIASTOLIC BLOOD PRESSURE: 78 MMHG | WEIGHT: 207 LBS

## 2024-02-13 DIAGNOSIS — K59.00 OBSTIPATION: ICD-10-CM

## 2024-02-13 DIAGNOSIS — O09.93 SUPERVISION OF HIGH RISK PREGNANCY IN THIRD TRIMESTER: Primary | ICD-10-CM

## 2024-02-13 DIAGNOSIS — Z83.49 FH: CYSTIC FIBROSIS: ICD-10-CM

## 2024-02-13 DIAGNOSIS — O99.343 DEPRESSION AFFECTING PREGNANCY IN THIRD TRIMESTER, ANTEPARTUM: ICD-10-CM

## 2024-02-13 DIAGNOSIS — Z91.51 HISTORY OF SUICIDE ATTEMPT: ICD-10-CM

## 2024-02-13 DIAGNOSIS — F32.A DEPRESSION AFFECTING PREGNANCY IN THIRD TRIMESTER, ANTEPARTUM: ICD-10-CM

## 2024-02-13 DIAGNOSIS — F19.91 HISTORY OF DRUG USE: ICD-10-CM

## 2024-02-13 DIAGNOSIS — Z67.91 RH NEGATIVE STATUS DURING PREGNANCY IN THIRD TRIMESTER: ICD-10-CM

## 2024-02-13 DIAGNOSIS — O99.013 ANEMIA AFFECTING PREGNANCY IN THIRD TRIMESTER: ICD-10-CM

## 2024-02-13 DIAGNOSIS — O34.219 PREVIOUS CESAREAN SECTION COMPLICATING PREGNANCY: ICD-10-CM

## 2024-02-13 DIAGNOSIS — Z14.1 CYSTIC FIBROSIS CARRIER: ICD-10-CM

## 2024-02-13 DIAGNOSIS — O26.893 RH NEGATIVE STATUS DURING PREGNANCY IN THIRD TRIMESTER: ICD-10-CM

## 2024-02-13 PROBLEM — O26.899 ABDOMINAL PAIN IN PREGNANCY, ANTEPARTUM: Status: RESOLVED | Noted: 2024-02-07 | Resolved: 2024-02-13

## 2024-02-13 PROBLEM — R10.9 ABDOMINAL PAIN DURING PREGNANCY IN THIRD TRIMESTER: Status: RESOLVED | Noted: 2024-02-08 | Resolved: 2024-02-13

## 2024-02-13 PROBLEM — O36.8130 DECREASED FETAL MOVEMENTS IN THIRD TRIMESTER: Status: RESOLVED | Noted: 2024-02-09 | Resolved: 2024-02-13

## 2024-02-13 PROBLEM — R10.9 ABDOMINAL PAIN IN PREGNANCY, ANTEPARTUM: Status: RESOLVED | Noted: 2024-02-07 | Resolved: 2024-02-13

## 2024-02-13 LAB
AMPHET UR QL SCN: NEGATIVE
BARBITURATES UR QL SCN: NEGATIVE
BENZODIAZ UR QL: NEGATIVE
CANNABINOIDS UR QL SCN: NEGATIVE
COCAINE UR QL SCN: NEGATIVE
METHADONE UR QL: NEGATIVE
OPIATES UR QL: NEGATIVE
PCP UR QL: NEGATIVE

## 2024-02-13 PROCEDURE — 99213 OFFICE O/P EST LOW 20 MIN: CPT | Performed by: OBSTETRICS & GYNECOLOGY

## 2024-02-13 NOTE — PROGRESS NOTES
GLYNN. .  37w3d   Denies LOF, VB, Ctxs.  Good FM.      Recent admission with abdominal pain. Initially thought to be pancreatitis, but was also severely constipated. On admission UDS found to be positive (opiates, amphetamine) and prior UDS at start of prenatal care was negative.     + BM x 2 since she has been home    Vitals:    24 1050   BP: 120/78      FHTs 150 today     Supervision of high risk pregnancy in third trimester  Kick counts and labor precautions reviewed      Obstipation  Severe constipation during inpatient admission at 36 weeks for abdominal pain   Treated with multiple enemas with +BM and resolution of pain   UDS positive for opiates at that time   Current regimen Mag oxide BID, Miralax, fiber supplement daily    Anemia affecting pregnancy in third trimester  S/p IV iron while inpatient      UDS today. Patient denies any drug use.     Katie Mcarthur,

## 2024-02-13 NOTE — ASSESSMENT & PLAN NOTE
Severe constipation during inpatient admission at 36 weeks for abdominal pain   Treated with multiple enemas with +BM and resolution of pain   UDS positive for opiates at that time   Current regimen ___

## 2024-02-21 ENCOUNTER — ROUTINE PRENATAL (OUTPATIENT)
Dept: OBGYN CLINIC | Age: 29
End: 2024-02-21
Payer: MEDICAID

## 2024-02-21 VITALS — SYSTOLIC BLOOD PRESSURE: 128 MMHG | BODY MASS INDEX: 32.42 KG/M2 | WEIGHT: 207 LBS | DIASTOLIC BLOOD PRESSURE: 86 MMHG

## 2024-02-21 DIAGNOSIS — Z67.91 RH NEGATIVE STATUS DURING PREGNANCY IN THIRD TRIMESTER: ICD-10-CM

## 2024-02-21 DIAGNOSIS — F32.A DEPRESSION AFFECTING PREGNANCY IN THIRD TRIMESTER, ANTEPARTUM: ICD-10-CM

## 2024-02-21 DIAGNOSIS — O99.343 DEPRESSION AFFECTING PREGNANCY IN THIRD TRIMESTER, ANTEPARTUM: ICD-10-CM

## 2024-02-21 DIAGNOSIS — Z91.51 HISTORY OF SUICIDE ATTEMPT: ICD-10-CM

## 2024-02-21 DIAGNOSIS — Z14.1 CYSTIC FIBROSIS CARRIER: ICD-10-CM

## 2024-02-21 DIAGNOSIS — O26.893 RH NEGATIVE STATUS DURING PREGNANCY IN THIRD TRIMESTER: ICD-10-CM

## 2024-02-21 DIAGNOSIS — Z83.49 FH: CYSTIC FIBROSIS: ICD-10-CM

## 2024-02-21 DIAGNOSIS — O09.93 SUPERVISION OF HIGH RISK PREGNANCY IN THIRD TRIMESTER: Primary | ICD-10-CM

## 2024-02-21 PROCEDURE — 99213 OFFICE O/P EST LOW 20 MIN: CPT | Performed by: OBSTETRICS & GYNECOLOGY

## 2024-02-21 NOTE — ASSESSMENT & PLAN NOTE
CS scheduled next week  Constipation improved and now with daily Bms  UDS negative at last visit   S/p IV iron for anemia while inpatient

## 2024-02-21 NOTE — PROGRESS NOTES
GLYNN. .  38w4d   Denies LOF, VB, Ctxs.  Good FM.      Vitals:    24 1412   BP: 128/86        Supervision of high risk pregnancy in third trimester  CS scheduled next week  Constipation improved and now with daily Bms  UDS negative at last visit   S/p IV iron for anemia while inpatient        Katie Mcarthur DO

## 2024-02-29 ENCOUNTER — ANESTHESIA EVENT (OUTPATIENT)
Dept: OPERATING ROOM | Age: 29
End: 2024-02-29
Payer: COMMERCIAL

## 2024-02-29 NOTE — H&P
CSAdmission Note    Kelsi Velazco  [id number]  Estimated Date of Delivery: 3/2/24     OB History          6    Para   4    Term   4            AB   1    Living   4         SAB   1    IAB        Ectopic        Molar        Multiple        Live Births   4                Patient admitted with pregnancy at 39w6d for scheduled RCS. Pregnancy is complicated by hx of CS x2, severe constipation with admission this pregnancy, and + UDS (opiates and amphetamines). Pregnancy is also complicated by JANET. She did receive IV iron during admission.     Recent UDS negative.       No current facility-administered medications for this encounter.     Current Outpatient Medications   Medication Sig Dispense Refill    hydrocortisone (ANUSOL-HC) 2.5 % CREA rectal cream Apply to anus tid prn pain 28 g 5    docusate sodium (COLACE, DULCOLAX) 100 MG CAPS Take 100 mg by mouth 2 times daily 60 capsule 5    polyethylene glycol (GLYCOLAX) 17 g packet Take 1 packet by mouth in the morning, at noon, in the evening, and at bedtime 120 packet 5    magnesium oxide (MAG-OX) 400 (240 Mg) MG tablet Take 1 tablet by mouth 2 times daily 60 tablet 5    pantoprazole (PROTONIX) 40 MG tablet Take 1 tablet by mouth 2 times daily (before meals) 60 tablet 5    famotidine (PEPCID) 20 MG tablet Take 1 tablet by mouth 2 times daily 60 tablet 5    lidocaine (XYLOCAINE) 2 % jelly Apply topically as needed. 30 mL 1    hydrocortisone (ANUSOL-HC) 25 MG suppository Place 1 suppository rectally in the morning and 1 suppository in the evening. 24 suppository 2    Misc. Devices (SITZ BATH) MISC Use daily as needed for hemorrhoids. 7 each 2    docusate sodium (COLACE) 100 MG capsule Take 1 capsule by mouth 2 times daily 60 capsule 2        EXAM: NST TBD on admission       Lab Results   Component Value Date/Time    ABORH A NEGATIVE 2023 02:19 PM    HIVEXT negative 2012 12:00 AM    RPREXT Non-Reactive 2012 12:00 AM        Plan: Admit for repeat

## 2024-03-01 ENCOUNTER — ANESTHESIA (OUTPATIENT)
Dept: OPERATING ROOM | Age: 29
End: 2024-03-01
Payer: COMMERCIAL

## 2024-03-01 ENCOUNTER — APPOINTMENT (OUTPATIENT)
Dept: LABOR AND DELIVERY | Age: 29
DRG: 540 | End: 2024-03-01
Payer: COMMERCIAL

## 2024-03-01 ENCOUNTER — HOSPITAL ENCOUNTER (INPATIENT)
Age: 29
LOS: 3 days | Discharge: HOME | DRG: 540 | End: 2024-03-04
Attending: OBSTETRICS & GYNECOLOGY | Admitting: OBSTETRICS & GYNECOLOGY
Payer: COMMERCIAL

## 2024-03-01 PROBLEM — Z98.891 HISTORY OF CESAREAN SECTION: Status: ACTIVE | Noted: 2024-03-01

## 2024-03-01 LAB
ABO + RH BLD: NORMAL
AMPHET UR QL SCN: NEGATIVE
BARBITURATES UR QL SCN: NEGATIVE
BASOPHILS # BLD: 0 K/UL (ref 0–0.2)
BASOPHILS NFR BLD: 1 % (ref 0–2)
BENZODIAZ UR QL: NEGATIVE
BLOOD GROUP ANTIBODIES SERPL: NORMAL
CANNABINOIDS UR QL SCN: NEGATIVE
COCAINE UR QL SCN: NEGATIVE
DIFFERENTIAL METHOD BLD: ABNORMAL
EOSINOPHIL # BLD: 0.1 K/UL (ref 0–0.8)
EOSINOPHIL NFR BLD: 1 % (ref 0.5–7.8)
ERYTHROCYTE [DISTWIDTH] IN BLOOD BY AUTOMATED COUNT: 15.9 % (ref 11.9–14.6)
HCT VFR BLD AUTO: 32.7 % (ref 35.8–46.3)
HGB BLD-MCNC: 10.6 G/DL (ref 11.7–15.4)
IMM GRANULOCYTES # BLD AUTO: 0.1 K/UL (ref 0–0.5)
IMM GRANULOCYTES NFR BLD AUTO: 1 % (ref 0–5)
LYMPHOCYTES # BLD: 2 K/UL (ref 0.5–4.6)
LYMPHOCYTES NFR BLD: 26 % (ref 13–44)
MCH RBC QN AUTO: 27.3 PG (ref 26.1–32.9)
MCHC RBC AUTO-ENTMCNC: 32.4 G/DL (ref 31.4–35)
MCV RBC AUTO: 84.3 FL (ref 82–102)
METHADONE UR QL: NEGATIVE
MONOCYTES # BLD: 0.6 K/UL (ref 0.1–1.3)
MONOCYTES NFR BLD: 8 % (ref 4–12)
NEUTS SEG # BLD: 4.9 K/UL (ref 1.7–8.2)
NEUTS SEG NFR BLD: 64 % (ref 43–78)
NRBC # BLD: 0 K/UL (ref 0–0.2)
OPIATES UR QL: NEGATIVE
PCP UR QL: NEGATIVE
PLATELET # BLD AUTO: 197 K/UL (ref 150–450)
PMV BLD AUTO: 10.4 FL (ref 9.4–12.3)
RBC # BLD AUTO: 3.88 M/UL (ref 4.05–5.2)
SPECIMEN EXP DATE BLD: NORMAL
WBC # BLD AUTO: 7.7 K/UL (ref 4.3–11.1)

## 2024-03-01 PROCEDURE — 2709999900 HC NON-CHARGEABLE SUPPLY: Performed by: OBSTETRICS & GYNECOLOGY

## 2024-03-01 PROCEDURE — 6370000000 HC RX 637 (ALT 250 FOR IP): Performed by: ANESTHESIOLOGY

## 2024-03-01 PROCEDURE — 7100000001 HC PACU RECOVERY - ADDTL 15 MIN: Performed by: OBSTETRICS & GYNECOLOGY

## 2024-03-01 PROCEDURE — 6360000002 HC RX W HCPCS: Performed by: OBSTETRICS & GYNECOLOGY

## 2024-03-01 PROCEDURE — 2580000003 HC RX 258: Performed by: OBSTETRICS & GYNECOLOGY

## 2024-03-01 PROCEDURE — 36415 COLL VENOUS BLD VENIPUNCTURE: CPT

## 2024-03-01 PROCEDURE — 85025 COMPLETE CBC W/AUTO DIFF WBC: CPT

## 2024-03-01 PROCEDURE — 3609079900 HC CESAREAN SECTION: Performed by: OBSTETRICS & GYNECOLOGY

## 2024-03-01 PROCEDURE — 6360000002 HC RX W HCPCS: Performed by: ANESTHESIOLOGY

## 2024-03-01 PROCEDURE — 2580000003 HC RX 258: Performed by: ANESTHESIOLOGY

## 2024-03-01 PROCEDURE — 7100000000 HC PACU RECOVERY - FIRST 15 MIN: Performed by: OBSTETRICS & GYNECOLOGY

## 2024-03-01 PROCEDURE — 80307 DRUG TEST PRSMV CHEM ANLYZR: CPT

## 2024-03-01 PROCEDURE — 86900 BLOOD TYPING SEROLOGIC ABO: CPT

## 2024-03-01 PROCEDURE — 3700000001 HC ADD 15 MINUTES (ANESTHESIA): Performed by: OBSTETRICS & GYNECOLOGY

## 2024-03-01 PROCEDURE — 86901 BLOOD TYPING SEROLOGIC RH(D): CPT

## 2024-03-01 PROCEDURE — 3700000000 HC ANESTHESIA ATTENDED CARE: Performed by: OBSTETRICS & GYNECOLOGY

## 2024-03-01 PROCEDURE — 2500000003 HC RX 250 WO HCPCS: Performed by: NURSE ANESTHETIST, CERTIFIED REGISTERED

## 2024-03-01 PROCEDURE — 1100000000 HC RM PRIVATE

## 2024-03-01 PROCEDURE — 6360000002 HC RX W HCPCS: Performed by: NURSE ANESTHETIST, CERTIFIED REGISTERED

## 2024-03-01 PROCEDURE — 59514 CESAREAN DELIVERY ONLY: CPT | Performed by: OBSTETRICS & GYNECOLOGY

## 2024-03-01 PROCEDURE — 86850 RBC ANTIBODY SCREEN: CPT

## 2024-03-01 PROCEDURE — 2580000003 HC RX 258: Performed by: NURSE ANESTHETIST, CERTIFIED REGISTERED

## 2024-03-01 PROCEDURE — 6370000000 HC RX 637 (ALT 250 FOR IP): Performed by: OBSTETRICS & GYNECOLOGY

## 2024-03-01 PROCEDURE — 2720000010 HC SURG SUPPLY STERILE: Performed by: OBSTETRICS & GYNECOLOGY

## 2024-03-01 RX ORDER — KETOROLAC TROMETHAMINE 30 MG/ML
INJECTION, SOLUTION INTRAMUSCULAR; INTRAVENOUS PRN
Status: DISCONTINUED | OUTPATIENT
Start: 2024-03-01 | End: 2024-03-01 | Stop reason: SDUPTHER

## 2024-03-01 RX ORDER — ONDANSETRON 2 MG/ML
4 INJECTION INTRAMUSCULAR; INTRAVENOUS EVERY 6 HOURS PRN
Status: DISCONTINUED | OUTPATIENT
Start: 2024-03-01 | End: 2024-03-02

## 2024-03-01 RX ORDER — ACETAMINOPHEN 500 MG
1000 TABLET ORAL EVERY 6 HOURS
Status: COMPLETED | OUTPATIENT
Start: 2024-03-01 | End: 2024-03-02

## 2024-03-01 RX ORDER — SODIUM CHLORIDE, SODIUM LACTATE, POTASSIUM CHLORIDE, CALCIUM CHLORIDE 600; 310; 30; 20 MG/100ML; MG/100ML; MG/100ML; MG/100ML
INJECTION, SOLUTION INTRAVENOUS CONTINUOUS PRN
Status: DISCONTINUED | OUTPATIENT
Start: 2024-03-01 | End: 2024-03-01 | Stop reason: SDUPTHER

## 2024-03-01 RX ORDER — MIDAZOLAM HYDROCHLORIDE 1 MG/ML
INJECTION INTRAMUSCULAR; INTRAVENOUS PRN
Status: DISCONTINUED | OUTPATIENT
Start: 2024-03-01 | End: 2024-03-01 | Stop reason: SDUPTHER

## 2024-03-01 RX ORDER — SODIUM CHLORIDE, SODIUM LACTATE, POTASSIUM CHLORIDE, CALCIUM CHLORIDE 600; 310; 30; 20 MG/100ML; MG/100ML; MG/100ML; MG/100ML
INJECTION, SOLUTION INTRAVENOUS PRN
Status: COMPLETED | OUTPATIENT
Start: 2024-03-01 | End: 2024-03-01

## 2024-03-01 RX ORDER — ACETAMINOPHEN 500 MG
1000 TABLET ORAL ONCE
Status: COMPLETED | OUTPATIENT
Start: 2024-03-01 | End: 2024-03-01

## 2024-03-01 RX ORDER — FENTANYL CITRATE 50 UG/ML
INJECTION, SOLUTION INTRAMUSCULAR; INTRAVENOUS PRN
Status: DISCONTINUED | OUTPATIENT
Start: 2024-03-01 | End: 2024-03-01 | Stop reason: SDUPTHER

## 2024-03-01 RX ORDER — SODIUM CHLORIDE 9 MG/ML
INJECTION, SOLUTION INTRAVENOUS PRN
Status: DISCONTINUED | OUTPATIENT
Start: 2024-03-01 | End: 2024-03-02

## 2024-03-01 RX ORDER — BUPIVACAINE HYDROCHLORIDE 7.5 MG/ML
INJECTION, SOLUTION INTRASPINAL
Status: COMPLETED | OUTPATIENT
Start: 2024-03-01 | End: 2024-03-01

## 2024-03-01 RX ORDER — HYDROMORPHONE HYDROCHLORIDE 1 MG/ML
0.5 INJECTION, SOLUTION INTRAMUSCULAR; INTRAVENOUS; SUBCUTANEOUS EVERY 4 HOURS PRN
Status: DISCONTINUED | OUTPATIENT
Start: 2024-03-01 | End: 2024-03-02

## 2024-03-01 RX ORDER — FAMOTIDINE 20 MG/1
20 TABLET, FILM COATED ORAL ONCE
Status: COMPLETED | OUTPATIENT
Start: 2024-03-01 | End: 2024-03-01

## 2024-03-01 RX ORDER — NALBUPHINE HYDROCHLORIDE 10 MG/ML
5 INJECTION, SOLUTION INTRAMUSCULAR; INTRAVENOUS; SUBCUTANEOUS EVERY 4 HOURS PRN
Status: DISCONTINUED | OUTPATIENT
Start: 2024-03-01 | End: 2024-03-01

## 2024-03-01 RX ORDER — KETOROLAC TROMETHAMINE 30 MG/ML
30 INJECTION, SOLUTION INTRAMUSCULAR; INTRAVENOUS EVERY 6 HOURS
Status: DISCONTINUED | OUTPATIENT
Start: 2024-03-01 | End: 2024-03-02

## 2024-03-01 RX ORDER — MORPHINE SULFATE 0.5 MG/ML
INJECTION, SOLUTION EPIDURAL; INTRATHECAL; INTRAVENOUS
Status: COMPLETED | OUTPATIENT
Start: 2024-03-01 | End: 2024-03-01

## 2024-03-01 RX ORDER — EPHEDRINE SULFATE 5 MG/ML
INJECTION INTRAVENOUS PRN
Status: DISCONTINUED | OUTPATIENT
Start: 2024-03-01 | End: 2024-03-01 | Stop reason: SDUPTHER

## 2024-03-01 RX ORDER — NALOXONE HYDROCHLORIDE 0.4 MG/ML
INJECTION, SOLUTION INTRAMUSCULAR; INTRAVENOUS; SUBCUTANEOUS PRN
Status: DISCONTINUED | OUTPATIENT
Start: 2024-03-01 | End: 2024-03-02

## 2024-03-01 RX ORDER — DIPHENHYDRAMINE HYDROCHLORIDE 50 MG/ML
INJECTION INTRAMUSCULAR; INTRAVENOUS PRN
Status: DISCONTINUED | OUTPATIENT
Start: 2024-03-01 | End: 2024-03-01 | Stop reason: SDUPTHER

## 2024-03-01 RX ORDER — SODIUM CHLORIDE 0.9 % (FLUSH) 0.9 %
5-40 SYRINGE (ML) INJECTION PRN
Status: DISCONTINUED | OUTPATIENT
Start: 2024-03-01 | End: 2024-03-02

## 2024-03-01 RX ORDER — SODIUM CHLORIDE, SODIUM LACTATE, POTASSIUM CHLORIDE, CALCIUM CHLORIDE 600; 310; 30; 20 MG/100ML; MG/100ML; MG/100ML; MG/100ML
INJECTION, SOLUTION INTRAVENOUS CONTINUOUS
Status: DISCONTINUED | OUTPATIENT
Start: 2024-03-01 | End: 2024-03-02

## 2024-03-01 RX ORDER — ONDANSETRON 2 MG/ML
4 INJECTION INTRAMUSCULAR; INTRAVENOUS ONCE
Status: COMPLETED | OUTPATIENT
Start: 2024-03-01 | End: 2024-03-01

## 2024-03-01 RX ORDER — POLYETHYLENE GLYCOL 3350 17 G/17G
17 POWDER, FOR SOLUTION ORAL 4 TIMES DAILY
Status: DISCONTINUED | OUTPATIENT
Start: 2024-03-01 | End: 2024-03-04 | Stop reason: HOSPADM

## 2024-03-01 RX ORDER — CITRIC ACID/SODIUM CITRATE 334-500MG
30 SOLUTION, ORAL ORAL ONCE
Status: COMPLETED | OUTPATIENT
Start: 2024-03-01 | End: 2024-03-01

## 2024-03-01 RX ORDER — LANOLIN ALCOHOL/MO/W.PET/CERES
400 CREAM (GRAM) TOPICAL 2 TIMES DAILY
Status: DISCONTINUED | OUTPATIENT
Start: 2024-03-01 | End: 2024-03-04 | Stop reason: HOSPADM

## 2024-03-01 RX ORDER — DOCUSATE SODIUM 100 MG/1
100 CAPSULE, LIQUID FILLED ORAL 2 TIMES DAILY
Status: DISCONTINUED | OUTPATIENT
Start: 2024-03-01 | End: 2024-03-04 | Stop reason: HOSPADM

## 2024-03-01 RX ORDER — HYDROCORTISONE 25 MG/G
CREAM TOPICAL 3 TIMES DAILY PRN
Status: DISCONTINUED | OUTPATIENT
Start: 2024-03-01 | End: 2024-03-04 | Stop reason: HOSPADM

## 2024-03-01 RX ORDER — OXYCODONE HYDROCHLORIDE 5 MG/1
5 TABLET ORAL EVERY 4 HOURS PRN
Status: DISPENSED | OUTPATIENT
Start: 2024-03-01 | End: 2024-03-02

## 2024-03-01 RX ORDER — SODIUM CHLORIDE 0.9 % (FLUSH) 0.9 %
5-40 SYRINGE (ML) INJECTION EVERY 12 HOURS SCHEDULED
Status: DISCONTINUED | OUTPATIENT
Start: 2024-03-01 | End: 2024-03-02

## 2024-03-01 RX ADMIN — Medication 400 MG: at 13:48

## 2024-03-01 RX ADMIN — DIPHENHYDRAMINE HYDROCHLORIDE 12.5 MG: 50 INJECTION INTRAMUSCULAR; INTRAVENOUS at 07:49

## 2024-03-01 RX ADMIN — ACETAMINOPHEN 1000 MG: 500 TABLET, FILM COATED ORAL at 06:15

## 2024-03-01 RX ADMIN — MIDAZOLAM 2 MG: 1 INJECTION INTRAMUSCULAR; INTRAVENOUS at 07:48

## 2024-03-01 RX ADMIN — ACETAMINOPHEN 1000 MG: 500 TABLET, FILM COATED ORAL at 12:01

## 2024-03-01 RX ADMIN — CEFAZOLIN 2000 MG: 10 INJECTION, POWDER, FOR SOLUTION INTRAVENOUS at 07:19

## 2024-03-01 RX ADMIN — KETOROLAC TROMETHAMINE 30 MG: 30 INJECTION INTRAMUSCULAR; INTRAVENOUS at 13:48

## 2024-03-01 RX ADMIN — PHENYLEPHRINE HYDROCHLORIDE 100 MCG: 0.1 INJECTION, SOLUTION INTRAVENOUS at 08:08

## 2024-03-01 RX ADMIN — PHENYLEPHRINE HYDROCHLORIDE 100 MCG: 0.1 INJECTION, SOLUTION INTRAVENOUS at 07:40

## 2024-03-01 RX ADMIN — ACETAMINOPHEN 1000 MG: 500 TABLET, FILM COATED ORAL at 23:22

## 2024-03-01 RX ADMIN — Medication 500 ML/HR: at 07:49

## 2024-03-01 RX ADMIN — KETOROLAC TROMETHAMINE 30 MG: 30 INJECTION INTRAMUSCULAR; INTRAVENOUS at 20:41

## 2024-03-01 RX ADMIN — BUPIVACAINE HYDROCHLORIDE IN DEXTROSE 13.5 MG: 7.5 INJECTION, SOLUTION SUBARACHNOID at 07:28

## 2024-03-01 RX ADMIN — SODIUM CHLORIDE, SODIUM LACTATE, POTASSIUM CHLORIDE, AND CALCIUM CHLORIDE: 600; 310; 30; 20 INJECTION, SOLUTION INTRAVENOUS at 07:24

## 2024-03-01 RX ADMIN — OXYCODONE 5 MG: 5 TABLET ORAL at 13:48

## 2024-03-01 RX ADMIN — Medication 400 MG: at 20:40

## 2024-03-01 RX ADMIN — SODIUM CITRATE AND CITRIC ACID MONOHYDRATE 30 ML: 334; 500 SOLUTION ORAL at 06:14

## 2024-03-01 RX ADMIN — FENTANYL CITRATE 100 MCG: 50 INJECTION, SOLUTION INTRAMUSCULAR; INTRAVENOUS at 07:55

## 2024-03-01 RX ADMIN — PHENYLEPHRINE HYDROCHLORIDE 100 MCG: 0.1 INJECTION, SOLUTION INTRAVENOUS at 08:03

## 2024-03-01 RX ADMIN — DOCUSATE SODIUM 100 MG: 100 CAPSULE, LIQUID FILLED ORAL at 13:48

## 2024-03-01 RX ADMIN — SODIUM CHLORIDE, POTASSIUM CHLORIDE, SODIUM LACTATE AND CALCIUM CHLORIDE: 600; 310; 30; 20 INJECTION, SOLUTION INTRAVENOUS at 06:56

## 2024-03-01 RX ADMIN — SODIUM CHLORIDE, POTASSIUM CHLORIDE, SODIUM LACTATE AND CALCIUM CHLORIDE: 600; 310; 30; 20 INJECTION, SOLUTION INTRAVENOUS at 06:00

## 2024-03-01 RX ADMIN — PHENYLEPHRINE HYDROCHLORIDE 200 MCG: 0.1 INJECTION, SOLUTION INTRAVENOUS at 07:42

## 2024-03-01 RX ADMIN — POLYETHYLENE GLYCOL 3350 17 G: 17 POWDER, FOR SOLUTION ORAL at 17:34

## 2024-03-01 RX ADMIN — SODIUM CHLORIDE, PRESERVATIVE FREE 10 ML: 5 INJECTION INTRAVENOUS at 20:41

## 2024-03-01 RX ADMIN — EPHEDRINE SULFATE 10 MG: 5 INJECTION INTRAVENOUS at 07:35

## 2024-03-01 RX ADMIN — DOCUSATE SODIUM 100 MG: 100 CAPSULE, LIQUID FILLED ORAL at 20:40

## 2024-03-01 RX ADMIN — PHENYLEPHRINE HYDROCHLORIDE 100 MCG: 0.1 INJECTION, SOLUTION INTRAVENOUS at 07:36

## 2024-03-01 RX ADMIN — ACETAMINOPHEN 1000 MG: 500 TABLET, FILM COATED ORAL at 17:34

## 2024-03-01 RX ADMIN — OXYCODONE 5 MG: 5 TABLET ORAL at 09:06

## 2024-03-01 RX ADMIN — PHENYLEPHRINE HYDROCHLORIDE 100 MCG: 0.1 INJECTION, SOLUTION INTRAVENOUS at 07:47

## 2024-03-01 RX ADMIN — SODIUM CHLORIDE, POTASSIUM CHLORIDE, SODIUM LACTATE AND CALCIUM CHLORIDE: 600; 310; 30; 20 INJECTION, SOLUTION INTRAVENOUS at 09:10

## 2024-03-01 RX ADMIN — KETOROLAC TROMETHAMINE 30 MG: 30 INJECTION INTRAMUSCULAR; INTRAVENOUS at 08:10

## 2024-03-01 RX ADMIN — OXYCODONE 5 MG: 5 TABLET ORAL at 22:09

## 2024-03-01 RX ADMIN — POLYETHYLENE GLYCOL 3350 17 G: 17 POWDER, FOR SOLUTION ORAL at 13:48

## 2024-03-01 RX ADMIN — EPHEDRINE SULFATE 5 MG: 5 INJECTION INTRAVENOUS at 07:30

## 2024-03-01 RX ADMIN — MORPHINE SULFATE 0.15 MG: 0.5 INJECTION, SOLUTION EPIDURAL; INTRATHECAL; INTRAVENOUS at 07:28

## 2024-03-01 RX ADMIN — PHENYLEPHRINE HYDROCHLORIDE 100 MCG: 0.1 INJECTION, SOLUTION INTRAVENOUS at 07:34

## 2024-03-01 RX ADMIN — FAMOTIDINE 20 MG: 20 TABLET, FILM COATED ORAL at 06:15

## 2024-03-01 RX ADMIN — ONDANSETRON 4 MG: 2 INJECTION INTRAMUSCULAR; INTRAVENOUS at 06:15

## 2024-03-01 RX ADMIN — POLYETHYLENE GLYCOL 3350 17 G: 17 POWDER, FOR SOLUTION ORAL at 20:40

## 2024-03-01 RX ADMIN — PHENYLEPHRINE HYDROCHLORIDE 100 MCG: 0.1 INJECTION, SOLUTION INTRAVENOUS at 07:55

## 2024-03-01 ASSESSMENT — PAIN DESCRIPTION - LOCATION
LOCATION: ABDOMEN;INCISION
LOCATION: ABDOMEN

## 2024-03-01 ASSESSMENT — PAIN DESCRIPTION - DESCRIPTORS
DESCRIPTORS: SORE
DESCRIPTORS: CRAMPING

## 2024-03-01 ASSESSMENT — PAIN - FUNCTIONAL ASSESSMENT: PAIN_FUNCTIONAL_ASSESSMENT: ACTIVITIES ARE NOT PREVENTED

## 2024-03-01 ASSESSMENT — PAIN SCALES - GENERAL
PAINLEVEL_OUTOF10: 5
PAINLEVEL_OUTOF10: 7

## 2024-03-01 ASSESSMENT — PAIN DESCRIPTION - ORIENTATION
ORIENTATION: ANTERIOR;LOWER
ORIENTATION: ANTERIOR;LOWER

## 2024-03-01 NOTE — OP NOTE
Section Delivery Operative Report       Patient: Kelsi Velazco MRN: [id number]  SSN: [id number]    YOB: 1995  Age: 29 y.o.  Sex: female       Date of Procedure: 24     Preoperative Diagnosis: Hx of  section [Z98.891]    Postoperative Diagnosis: * No post-op diagnosis entered *    Procedure: Repeat LTCS    Surgeon(s):  Katie Mcarthur DO    Indication: Desire for repeat     Anesthesia: Spinal    Prophylactic Antibiotics: Ancef    QBL:  ml          Information for the patient's :  Juan Pablo Velazco [[id number]]   @[id number]@      Specimens: * No specimens in log *            Complications:  none    Findings:  Bladder pulled up to mid uterus. Otherwise no significant intraabdominal adhesions      Procedure Details:    Patient was taken to the OR after informed consent had been obtained. After anesthesia was found to be adequate, she was then placed in a dorsal supine position with a left lateral tilt. She was then prepped and draped in a sterile fashion. Time out was completed.      Attention was turned to the abdomen and an Allis test confirmed adequate anesthesia.  A Pfannenstiel skin incision was made with the scalpel down to the fascia.  The fascia was knicked in the midline.  The incision was extended laterally using cárdenas scissors.  The cephalad fascia was grasped with kochers and the rectus muscles  off both bluntly and sharply.  Rectus muscles were  bluntly.  The peritoneum was entered bluntly and the incision was extended laterally bluntly.  The bladder blade was inserted.  A bladder flap was created as bladder was pulled up above where hysterotomy needed to be made.  A low transverse incision was made on the uterus in the midline.  The uterine incision was extended laterally bluntly.      The fetus was delivered from a cephalic  position through the uterine incision. The cord was doubly clamped and transected. The infant was handed off to

## 2024-03-01 NOTE — ANESTHESIA PRE PROCEDURE
MD       • sodium chloride flush 0.9 % injection 5-40 mL  5-40 mL IntraVENous PRN Moe Wilson MD       • 0.9 % sodium chloride infusion   IntraVENous PRN Moe Wilson MD       • lactated ringers IV soln infusion   IntraVENous PRN Katie Mcarthur  mL/hr at 24 0600 New Bag at 24 0600   • ceFAZolin (ANCEF) 2000 mg in sterile water 20 mL IV syringe  2,000 mg IntraVENous Once Katie Mcarthur DO           Allergies:    Allergies   Allergen Reactions   • Tramadol Rash       Problem List:    Patient Active Problem List   Diagnosis Code   • Rh negative status during pregnancy in third trimester O26.893, Z67.91   • Depression affecting pregnancy in third trimester, antepartum O99.343, F32.A   • Previous  section complicating pregnancy O34.219   • 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, antepartum condition O36.0990   • History of suicide attempt Z91.51   • 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   • Supervision of high risk pregnancy in third trimester O09.93   • History of drug use F19.91   • FH: cystic fibrosis Z83.49   • Anemia affecting pregnancy in third trimester O99.013   • Acute pancreatitis K85.90   • Obstipation K59.00       Past Medical History:        Diagnosis Date   • Abnormal Pap smear    • Cystic fibrosis carrier     Daughter has CF   • Depression    • GERD (gastroesophageal reflux disease)     diet controlled   • History of suicide attempt    • Hyperemesis affecting pregnancy, antepartum 2020   • Marginal placenta previa 10/23/2023    10/23/2023 [facility], likely to resolve.   23 [facility]: Resolved-3.5cm from os.   • Rhesus isoimmunization unspecified as to episode of care in pregnancy        Past Surgical History:        Procedure Laterality Date   •  SECTION      x2   • TONSILLECTOMY     • WISDOM TOOTH EXTRACTION         Social History:    Social History     Tobacco Use   • Smoking status: Former     Current packs/day: 0.00     Types: Cigarettes

## 2024-03-01 NOTE — ANESTHESIA POSTPROCEDURE EVALUATION
Department of Anesthesiology  Postprocedure Note    Patient: Kelsi Velazco  MRN: [id number]  YOB: 1995  Date of evaluation: 3/1/2024    Procedure Summary       Date: 24 Room / Location: [facility]  [facility]    Anesthesia Start: 724 Anesthesia Stop: 828    Procedure:  SECTION (Abdomen) Diagnosis:       Hx of  section      (Hx of  section [Z98.891])    Surgeons: Katie Mcarthur DO Responsible Provider: Moe Wilson MD    Anesthesia Type: spinal ASA Status: 2            Anesthesia Type: No value filed.    Kathleen Phase I: Kathleen Score: 9    Kathleen Phase II:      Anesthesia Post Evaluation    Patient location during evaluation: floor  Patient participation: complete - patient participated  Level of consciousness: awake and alert  Pain score: 1  Airway patency: patent  Nausea & Vomiting: no nausea  Cardiovascular status: blood pressure returned to baseline and hemodynamically stable  Respiratory status: acceptable  Hydration status: euvolemic  Multimodal analgesia pain management approach  Pain management: adequate and satisfactory to patient    No notable events documented.

## 2024-03-01 NOTE — L&D DELIVERY NOTE
Diana, RN Primary Nurse    Tiera Geronimo RN Primary [place] Nurse    Delia Lee DO Neonatologist    Moe Wilson MD Anesthesiologist    Jamie Alba, APRN - CRNA Nurse Anesthetist    Agatha Torres Scrub Tech    Shila Funez Scrub Tech              [place] Assessment    Living Status: Living  Delivery Location Comment: OR 2        Skin Color:   Heart Rate:   Reflex Irritability:   Muscle Tone:   Respiratory Effort:   Total:            1 Minute:    0    2    2    2    2    8         5 Minute:    1    2    2    2    2    9                                        Apgars Assigned By: DR LEE              Resuscitation    Method: Bulb Suction, Room Air, Stimulation              Measurements      Birth Weight: 4020 g   Birth Length: 54 cm     Head Circumference: 37 cm     Chest Circumference: 33.5 cm

## 2024-03-01 NOTE — ANESTHESIA PROCEDURE NOTES
Spinal Block    Patient location during procedure: OR  End time: 3/1/2024 7:32 AM  Reason for block: primary anesthetic  Staffing  Performed: anesthesiologist   Anesthesiologist: Moe Wilson MD  Performed by: Moe Wilson MD  Authorized by: Moe Wilson MD    Spinal Block  Patient position: sitting  Prep: ChloraPrep and site prepped and draped  Patient monitoring: oxygen, frequent blood pressure checks, continuous pulse ox and cardiac monitor  Approach: midline  Location: L3/L4  Provider prep: mask and sterile gloves  Local infiltration: lidocaine  Needle  Needle gauge: 25 G  Needle length: 4 in  Assessment  Sensory level: T6  Swirl obtained: Yes  CSF: clear  Hemodynamics: stable  Preanesthetic Checklist  Completed: patient identified, IV checked, risks and benefits discussed, surgical/procedural consents, equipment checked, pre-op evaluation, timeout performed, anesthesia consent given, oxygen available, monitors applied/VS acknowledged and fire risk safety assessment completed and verbalized

## 2024-03-01 NOTE — CARE COORDINATION
SW is familiar with patient due to interactions during hospitalization on 24 (please see notes).    Patient with negative urine drug screens on 24 and 3/1/24.  Urine drug screen pending on collection from baby.  Umbilical drug screen also ordered.    0915:  Phone call placed to Yuly Woo with DSS ([phone]) to inform of delivery as well as recent negative urine drug screens.  Per Maria Fernanda, she will come see patient this afternoon in order to \"lay eyes on the baby.\"    1520:  Baby's urine drug screen is negative.  Copy of patient and  urine drug screens left at RN desk for Yuly.    CHRIS will continue to follow.    Pallavi Wells, ZENAIDA-SARAH, PMH-C  [facility]   [phone]

## 2024-03-01 NOTE — PROGRESS NOTES
Admission assessment complete as noted. Patient oriented to room and unit. Plan of care reviewed and patient verbalizes understanding. Questions encouraged and answered. Patent encouraged to call for needs or concerns.   Safety Teaching reviewed:   Hand hygiene prior to handling the infant.  Use of bulb syringe.  Bracelets with matching numbers are placed on mother and infant  An infant security tag  (Hugs) is placed on the infant's ankle and monitored  All OB nurses wear pink Employee badges - do not give your baby to anyone without proper identification.   Never leave the baby alone in the room.  The infant should be placed on their back to sleep.on a firm mattress. No toys should be placed in the crib.   Never shake the baby   Infant fall prevention - do not sleep with the baby, and place the baby in the crib while ambulating.   Mother and Baby Care booklet given to Mother.

## 2024-03-01 NOTE — PROGRESS NOTES
Patient requesting to get out of bed. Patient tolerating PO and output is 700ml in stewart bag. Removed stewart, catheter tip intact. Assisted patient OOB to BR, gait is steady. Educated patient on talha-care, patient demonstrated and verbalized understanding. Patient ambulating around room, no needs voiced at present time.

## 2024-03-01 NOTE — LACTATION NOTE
This note was copied from a baby's chart.  In to see mom and infant for the first time. Infant was asleep in mom's arms. Mom stated that infant latched and nursed well at first feeding. Reviewed expectations of the first 24 hours. Mom stated that she has no concerns at this time. Lactation consultant will follow up as needed.

## 2024-03-02 ENCOUNTER — ANESTHESIA (OUTPATIENT)
Dept: MOTHER INFANT UNIT | Age: 29
End: 2024-03-02
Payer: COMMERCIAL

## 2024-03-02 ENCOUNTER — ANESTHESIA EVENT (OUTPATIENT)
Dept: MOTHER INFANT UNIT | Age: 29
End: 2024-03-02
Payer: COMMERCIAL

## 2024-03-02 LAB — HGB BLD-MCNC: 10.5 G/DL (ref 11.7–15.4)

## 2024-03-02 PROCEDURE — 6360000002 HC RX W HCPCS: Performed by: ANESTHESIOLOGY

## 2024-03-02 PROCEDURE — 85018 HEMOGLOBIN: CPT

## 2024-03-02 PROCEDURE — 1100000000 HC RM PRIVATE

## 2024-03-02 PROCEDURE — 36415 COLL VENOUS BLD VENIPUNCTURE: CPT

## 2024-03-02 PROCEDURE — 6370000000 HC RX 637 (ALT 250 FOR IP): Performed by: OBSTETRICS & GYNECOLOGY

## 2024-03-02 PROCEDURE — 6370000000 HC RX 637 (ALT 250 FOR IP): Performed by: ANESTHESIOLOGY

## 2024-03-02 RX ORDER — DIPHENHYDRAMINE HCL 25 MG
25 CAPSULE ORAL EVERY 6 HOURS PRN
Status: DISCONTINUED | OUTPATIENT
Start: 2024-03-02 | End: 2024-03-04 | Stop reason: HOSPADM

## 2024-03-02 RX ORDER — BISACODYL 5 MG/1
5 TABLET, DELAYED RELEASE ORAL DAILY
Status: DISCONTINUED | OUTPATIENT
Start: 2024-03-02 | End: 2024-03-04 | Stop reason: HOSPADM

## 2024-03-02 RX ORDER — PANTOPRAZOLE SODIUM 40 MG/1
40 TABLET, DELAYED RELEASE ORAL DAILY PRN
Status: DISCONTINUED | OUTPATIENT
Start: 2024-03-02 | End: 2024-03-04 | Stop reason: HOSPADM

## 2024-03-02 RX ORDER — GABAPENTIN 300 MG/1
300 CAPSULE ORAL 3 TIMES DAILY PRN
Status: DISCONTINUED | OUTPATIENT
Start: 2024-03-02 | End: 2024-03-04 | Stop reason: HOSPADM

## 2024-03-02 RX ORDER — SIMETHICONE 80 MG
80 TABLET,CHEWABLE ORAL EVERY 6 HOURS PRN
Status: DISCONTINUED | OUTPATIENT
Start: 2024-03-02 | End: 2024-03-04 | Stop reason: HOSPADM

## 2024-03-02 RX ORDER — IBUPROFEN 800 MG/1
800 TABLET ORAL EVERY 6 HOURS PRN
Status: DISCONTINUED | OUTPATIENT
Start: 2024-03-02 | End: 2024-03-04 | Stop reason: HOSPADM

## 2024-03-02 RX ORDER — ONDANSETRON 2 MG/ML
4 INJECTION INTRAMUSCULAR; INTRAVENOUS EVERY 6 HOURS PRN
Status: DISCONTINUED | OUTPATIENT
Start: 2024-03-02 | End: 2024-03-04 | Stop reason: HOSPADM

## 2024-03-02 RX ORDER — SODIUM CHLORIDE 9 MG/ML
INJECTION, SOLUTION INTRAVENOUS PRN
Status: DISCONTINUED | OUTPATIENT
Start: 2024-03-02 | End: 2024-03-04 | Stop reason: HOSPADM

## 2024-03-02 RX ORDER — OXYCODONE HYDROCHLORIDE 5 MG/1
10 TABLET ORAL EVERY 4 HOURS PRN
Status: DISCONTINUED | OUTPATIENT
Start: 2024-03-02 | End: 2024-03-04 | Stop reason: HOSPADM

## 2024-03-02 RX ORDER — SODIUM CHLORIDE 0.9 % (FLUSH) 0.9 %
5-40 SYRINGE (ML) INJECTION PRN
Status: DISCONTINUED | OUTPATIENT
Start: 2024-03-02 | End: 2024-03-04 | Stop reason: HOSPADM

## 2024-03-02 RX ORDER — ACETAMINOPHEN 500 MG
1000 TABLET ORAL EVERY 8 HOURS
Status: DISCONTINUED | OUTPATIENT
Start: 2024-03-02 | End: 2024-03-04 | Stop reason: HOSPADM

## 2024-03-02 RX ORDER — DIPHENHYDRAMINE HYDROCHLORIDE 50 MG/ML
12.5 INJECTION INTRAMUSCULAR; INTRAVENOUS EVERY 6 HOURS PRN
Status: DISCONTINUED | OUTPATIENT
Start: 2024-03-02 | End: 2024-03-02

## 2024-03-02 RX ORDER — SODIUM CHLORIDE 0.9 % (FLUSH) 0.9 %
5-40 SYRINGE (ML) INJECTION EVERY 12 HOURS SCHEDULED
Status: DISCONTINUED | OUTPATIENT
Start: 2024-03-02 | End: 2024-03-02

## 2024-03-02 RX ORDER — OXYCODONE HYDROCHLORIDE 5 MG/1
5 TABLET ORAL EVERY 4 HOURS PRN
Status: DISCONTINUED | OUTPATIENT
Start: 2024-03-02 | End: 2024-03-04 | Stop reason: HOSPADM

## 2024-03-02 RX ORDER — ONDANSETRON 4 MG/1
4 TABLET, ORALLY DISINTEGRATING ORAL EVERY 8 HOURS PRN
Status: DISCONTINUED | OUTPATIENT
Start: 2024-03-02 | End: 2024-03-04 | Stop reason: HOSPADM

## 2024-03-02 RX ORDER — IBUPROFEN 800 MG/1
800 TABLET ORAL EVERY 8 HOURS PRN
Status: DISCONTINUED | OUTPATIENT
Start: 2024-03-02 | End: 2024-03-02

## 2024-03-02 RX ORDER — LANOLIN
CREAM (ML) TOPICAL
Status: DISCONTINUED | OUTPATIENT
Start: 2024-03-02 | End: 2024-03-04 | Stop reason: HOSPADM

## 2024-03-02 RX ADMIN — IBUPROFEN 800 MG: 800 TABLET, FILM COATED ORAL at 10:08

## 2024-03-02 RX ADMIN — Medication 400 MG: at 19:42

## 2024-03-02 RX ADMIN — OXYCODONE 5 MG: 5 TABLET ORAL at 02:16

## 2024-03-02 RX ADMIN — POLYETHYLENE GLYCOL 3350 17 G: 17 POWDER, FOR SOLUTION ORAL at 10:08

## 2024-03-02 RX ADMIN — Medication 400 MG: at 10:08

## 2024-03-02 RX ADMIN — DOCUSATE SODIUM 100 MG: 100 CAPSULE, LIQUID FILLED ORAL at 19:42

## 2024-03-02 RX ADMIN — DOCUSATE SODIUM 100 MG: 100 CAPSULE, LIQUID FILLED ORAL at 10:08

## 2024-03-02 RX ADMIN — OXYCODONE 5 MG: 5 TABLET ORAL at 07:12

## 2024-03-02 RX ADMIN — ACETAMINOPHEN 1000 MG: 500 TABLET, FILM COATED ORAL at 05:26

## 2024-03-02 RX ADMIN — BISACODYL 5 MG: 5 TABLET, COATED ORAL at 13:10

## 2024-03-02 RX ADMIN — IBUPROFEN 800 MG: 800 TABLET, FILM COATED ORAL at 19:42

## 2024-03-02 RX ADMIN — OXYCODONE 10 MG: 5 TABLET ORAL at 21:16

## 2024-03-02 RX ADMIN — GABAPENTIN 300 MG: 300 CAPSULE ORAL at 12:09

## 2024-03-02 RX ADMIN — ACETAMINOPHEN 1000 MG: 500 TABLET, FILM COATED ORAL at 22:44

## 2024-03-02 RX ADMIN — POLYETHYLENE GLYCOL 3350 17 G: 17 POWDER, FOR SOLUTION ORAL at 17:23

## 2024-03-02 RX ADMIN — OXYCODONE 10 MG: 5 TABLET ORAL at 13:10

## 2024-03-02 RX ADMIN — POLYETHYLENE GLYCOL 3350 17 G: 17 POWDER, FOR SOLUTION ORAL at 13:10

## 2024-03-02 RX ADMIN — KETOROLAC TROMETHAMINE 30 MG: 30 INJECTION INTRAMUSCULAR; INTRAVENOUS at 02:16

## 2024-03-02 RX ADMIN — POLYETHYLENE GLYCOL 3350 17 G: 17 POWDER, FOR SOLUTION ORAL at 19:42

## 2024-03-02 RX ADMIN — OXYCODONE 10 MG: 5 TABLET ORAL at 17:22

## 2024-03-02 RX ADMIN — ACETAMINOPHEN 1000 MG: 500 TABLET, FILM COATED ORAL at 14:37

## 2024-03-02 ASSESSMENT — PAIN DESCRIPTION - LOCATION
LOCATION: ABDOMEN
LOCATION: ABDOMEN;INCISION
LOCATION: BACK

## 2024-03-02 ASSESSMENT — PAIN DESCRIPTION - ORIENTATION
ORIENTATION: LOWER

## 2024-03-02 ASSESSMENT — PAIN DESCRIPTION - DESCRIPTORS
DESCRIPTORS: CRAMPING
DESCRIPTORS: CRAMPING;SORE
DESCRIPTORS: SORE
DESCRIPTORS: SORE
DESCRIPTORS: SORE;ACHING
DESCRIPTORS: ACHING

## 2024-03-02 ASSESSMENT — PAIN SCALES - GENERAL
PAINLEVEL_OUTOF10: 10
PAINLEVEL_OUTOF10: 2
PAINLEVEL_OUTOF10: 7
PAINLEVEL_OUTOF10: 3
PAINLEVEL_OUTOF10: 7
PAINLEVEL_OUTOF10: 5
PAINLEVEL_OUTOF10: 4

## 2024-03-02 NOTE — LACTATION NOTE
This note was copied from a baby's chart.  In to follow up with mom and infant. Mom stated that she has offered infant formula supplement but that infant continues to latch and nurse well. She also asked questions in regards to pumping and storage. Reviewed second night of life. Lactation consultant will follow up tomorrow.

## 2024-03-02 NOTE — PROGRESS NOTES
Anesthesiology  Post-op Note    Post-op day 1 s/p  via spinal with neuraxial opioids for post-op pain management.  /69   Pulse 72   Temp 97.5 °F (36.4 °C)   Resp 18   Ht 1.702 m (5' 7\")   Wt 93.9 kg (207 lb)   LMP 2023 (Approximate)   SpO2 98%   Breastfeeding Unknown   BMI 32.42 kg/m²   Airway patent, patient appropriately hydrated and appears euvolemic.  Patient is Alert and oriented.  Pain is well controlled.  Pruritus is well controlled.  Nausea is well controlled.  No complaints about back or site of injection.  Motor and sensory function has returned to baseline in lower extremities. Patient is satisfied with anesthetic and reports no complications.  Continue current orders, then initiate surgeon's orders for pain management 24 hours after .  Follow up per surgeon.

## 2024-03-02 NOTE — PROGRESS NOTES
Progress Note                               Patient: Kelsi Velazco MRN: [id number]  SSN: [id number]    YOB: 1995  Age: 29 y.o.  Sex: female      1 Day Post-Op     Subjective:     Patient doing well postpartum without significant complaints. Voiding without difficulty.  Pain controlled on current medications.  Lochia is appropriate, ambulating, passing flatus.  States last bm was 3 days ago.  Denies n/v, tolerating regular diet.      Denies HA, SOB/CP, RUQ pain, vision changes.       Has voiced interest in early DC home tomorrow.      Objective:     Patient Vitals for the past 12 hrs:   Temp Pulse Resp BP SpO2   24 0704 97.5 °F (36.4 °C) 72 18 114/69 98 %   24 0525 97.8 °F (36.6 °C) 80 16 112/65 97 %       Temp (24hrs), Av.6 °F (36.4 °C), Min:97.4 °F (36.3 °C), Max:97.8 °F (36.6 °C)        Intake/Output Summary (Last 24 hours) at 3/2/2024 1246  Last data filed at 3/2/2024 0600  Gross per 24 hour   Intake --   Output 1800 ml   Net -1800 ml         Physical Exam:    Constitutional: She appears well-developed and well-nourished. No distress.    HENT:    Head: Normocephalic and atraumatic.    Cardiovascular: RRR  Pulmonary/Chest: CTAB  Abd: S/appropriately TTP/mildly D, BS normoactive, fundus firm at umbilicus, Incision c/d/i, no erythema/induration  Ext: No c/c/e      Lab/Data Review:  Recent Results (from the past 72 hour(s))   Urine Drug Screen    Collection Time: 24  6:01 AM   Result Value Ref Range    PCP, Urine Negative      Benzodiazepines, Urine Negative      Cocaine, Urine Negative      Amphetamine, Urine Negative      Methadone, Urine Negative      THC, TH-Cannabinol, Urine Negative      Opiates, Urine Negative      Barbiturates, Urine Negative     TYPE AND SCREEN    Collection Time: 24  6:01 AM   Result Value Ref Range    Crossmatch expiration date 2024,2359     ABO/Rh A NEGATIVE     Antibody Screen NEG    CBC with Auto Differential    Collection Time:

## 2024-03-02 NOTE — CARE COORDINATION
Initial note:    Chart reviewed for updates. CM completed initial assessment with patient. Demographics confirmed.    Pt has a history of depression. She reports that she is not on any depression medication.     Patient given informational packet on  mood & anxiety disorders (resources/education).    Family denies any additional needs from  at this time.  Family has 's contact information should any needs/questions arise.     LISA Trinh

## 2024-03-03 PROCEDURE — 6370000000 HC RX 637 (ALT 250 FOR IP): Performed by: OBSTETRICS & GYNECOLOGY

## 2024-03-03 PROCEDURE — 1100000000 HC RM PRIVATE

## 2024-03-03 RX ADMIN — ACETAMINOPHEN 500 MG: 500 TABLET, FILM COATED ORAL at 16:55

## 2024-03-03 RX ADMIN — OXYCODONE 10 MG: 5 TABLET ORAL at 14:44

## 2024-03-03 RX ADMIN — GABAPENTIN 300 MG: 300 CAPSULE ORAL at 21:05

## 2024-03-03 RX ADMIN — OXYCODONE 10 MG: 5 TABLET ORAL at 19:03

## 2024-03-03 RX ADMIN — OXYCODONE 10 MG: 5 TABLET ORAL at 23:13

## 2024-03-03 RX ADMIN — GABAPENTIN 300 MG: 300 CAPSULE ORAL at 12:48

## 2024-03-03 RX ADMIN — DOCUSATE SODIUM 100 MG: 100 CAPSULE, LIQUID FILLED ORAL at 21:04

## 2024-03-03 RX ADMIN — POLYETHYLENE GLYCOL 3350 17 G: 17 POWDER, FOR SOLUTION ORAL at 07:55

## 2024-03-03 RX ADMIN — DOCUSATE SODIUM 100 MG: 100 CAPSULE, LIQUID FILLED ORAL at 07:56

## 2024-03-03 RX ADMIN — POLYETHYLENE GLYCOL 3350 17 G: 17 POWDER, FOR SOLUTION ORAL at 21:04

## 2024-03-03 RX ADMIN — BISACODYL 5 MG: 5 TABLET, COATED ORAL at 11:23

## 2024-03-03 RX ADMIN — POLYETHYLENE GLYCOL 3350 17 G: 17 POWDER, FOR SOLUTION ORAL at 16:56

## 2024-03-03 RX ADMIN — POLYETHYLENE GLYCOL 3350 17 G: 17 POWDER, FOR SOLUTION ORAL at 12:48

## 2024-03-03 RX ADMIN — IBUPROFEN 800 MG: 800 TABLET, FILM COATED ORAL at 11:24

## 2024-03-03 RX ADMIN — WITCH HAZEL: 500 SOLUTION RECTAL; TOPICAL at 21:23

## 2024-03-03 RX ADMIN — Medication 400 MG: at 07:56

## 2024-03-03 RX ADMIN — IBUPROFEN 800 MG: 800 TABLET, FILM COATED ORAL at 01:04

## 2024-03-03 RX ADMIN — OXYCODONE 10 MG: 5 TABLET ORAL at 01:04

## 2024-03-03 RX ADMIN — IBUPROFEN 800 MG: 800 TABLET, FILM COATED ORAL at 18:28

## 2024-03-03 RX ADMIN — Medication 400 MG: at 21:04

## 2024-03-03 RX ADMIN — ACETAMINOPHEN 1000 MG: 500 TABLET, FILM COATED ORAL at 07:56

## 2024-03-03 RX ADMIN — OXYCODONE 10 MG: 5 TABLET ORAL at 07:56

## 2024-03-03 RX ADMIN — ACETAMINOPHEN 500 MG: 500 TABLET, FILM COATED ORAL at 16:59

## 2024-03-03 ASSESSMENT — PAIN SCALES - GENERAL
PAINLEVEL_OUTOF10: 7
PAINLEVEL_OUTOF10: 5
PAINLEVEL_OUTOF10: 3
PAINLEVEL_OUTOF10: 9
PAINLEVEL_OUTOF10: 4
PAINLEVEL_OUTOF10: 4
PAINLEVEL_OUTOF10: 7
PAINLEVEL_OUTOF10: 7

## 2024-03-03 ASSESSMENT — PAIN DESCRIPTION - LOCATION
LOCATION: ABDOMEN;INCISION
LOCATION: ABDOMEN

## 2024-03-03 ASSESSMENT — PAIN DESCRIPTION - ORIENTATION
ORIENTATION: LOWER;MID
ORIENTATION: LOWER

## 2024-03-03 ASSESSMENT — PAIN - FUNCTIONAL ASSESSMENT
PAIN_FUNCTIONAL_ASSESSMENT: ACTIVITIES ARE NOT PREVENTED
PAIN_FUNCTIONAL_ASSESSMENT: ACTIVITIES ARE NOT PREVENTED

## 2024-03-03 ASSESSMENT — PAIN DESCRIPTION - DESCRIPTORS
DESCRIPTORS: SORE
DESCRIPTORS: CRAMPING
DESCRIPTORS: ACHING;BURNING
DESCRIPTORS: ACHING;SORE
DESCRIPTORS: CRAMPING
DESCRIPTORS: ACHING;BURNING;SORE;CRAMPING
DESCRIPTORS: CRAMPING
DESCRIPTORS: BURNING;CRAMPING

## 2024-03-03 NOTE — LACTATION NOTE
This note was copied from a baby's chart.  In to see mom and infant for follow up. Overall mom plans to breast feed and formula feed. She has pump at home to use as needed. Reviewed importance of 8-12 full feeds per day. When came in mom stated she had already fed baby on left breast x 10 minute and saw end of 10 minute feed on right breast in football hold. Baby had white milk in mouth when popped off breast. Baby overall had pretty good latch, but when baby needed to get back on, tried to show mom how to get deeper latch and keep baby aligned well on breast. When baby came off again mom did not want to put baby back on breast or more help. Wrapped baby back up in bassinet. Measured her nipples for personal pump and discussed pump flange recommendation. Mom had no further questions or needs at this time.

## 2024-03-03 NOTE — PROGRESS NOTES
Progress Note                               Patient: Kelsi Velazco MRN: [id number]  SSN: [id number]    YOB: 1995  Age: 29 y.o.  Sex: female      2 Days Post-Op     Subjective:     Patient doing well postpartum without significant complaints. Voiding without difficulty.  Pain controlled on current medications but more sore today overall.  Now desires to stay until tomorrow.  Lochia is appropriate, ambulating, passing flatus .  Denies n/v, tolerating regular diet.      Denies HA, SOB/CP, RUQ pain, vision changes.       Objective:     Patient Vitals for the past 12 hrs:   Temp Pulse Resp BP SpO2   24 0803 98.1 °F (36.7 °C) 91 15 118/78 97 %   24 2246 98.4 °F (36.9 °C) 90 18 112/75 96 %       Temp (24hrs), Av.1 °F (36.7 °C), Min:97.9 °F (36.6 °C), Max:98.4 °F (36.9 °C)      No intake or output data in the 24 hours ending 24 0956      Physical Exam:    Constitutional: She appears well-developed and well-nourished. No distress.    HENT:    Head: Normocephalic and atraumatic.    Cardiovascular: RRR  Pulmonary/Chest: CTAB  Abd: S/appropriately TTP/ND, BS normoactive, fundus firm at umbilicus, Incision c/d/i, no erythema/induration  Ext: No c/c/e 1+       Lab/Data Review:  Recent Results (from the past 72 hour(s))   Urine Drug Screen    Collection Time: 24  6:01 AM   Result Value Ref Range    PCP, Urine Negative      Benzodiazepines, Urine Negative      Cocaine, Urine Negative      Amphetamine, Urine Negative      Methadone, Urine Negative      THC, TH-Cannabinol, Urine Negative      Opiates, Urine Negative      Barbiturates, Urine Negative     TYPE AND SCREEN    Collection Time: 24  6:01 AM   Result Value Ref Range    Crossmatch expiration date 2024,2359     ABO/Rh A NEGATIVE     Antibody Screen NEG    CBC with Auto Differential    Collection Time: 24  6:24 AM   Result Value Ref Range    WBC 7.7 4.3 - 11.1 K/uL    RBC 3.88 (L) 4.05 - 5.2 M/uL    Hemoglobin 10.6

## 2024-03-04 VITALS
RESPIRATION RATE: 16 BRPM | SYSTOLIC BLOOD PRESSURE: 114 MMHG | DIASTOLIC BLOOD PRESSURE: 79 MMHG | HEIGHT: 67 IN | WEIGHT: 207 LBS | TEMPERATURE: 98.4 F | OXYGEN SATURATION: 100 % | BODY MASS INDEX: 32.49 KG/M2 | HEART RATE: 75 BPM

## 2024-03-04 PROCEDURE — 6370000000 HC RX 637 (ALT 250 FOR IP): Performed by: OBSTETRICS & GYNECOLOGY

## 2024-03-04 RX ORDER — DOCUSATE SODIUM 100 MG/1
100 CAPSULE, LIQUID FILLED ORAL DAILY PRN
Qty: 30 CAPSULE | Refills: 0 | Status: SHIPPED | OUTPATIENT
Start: 2024-03-04

## 2024-03-04 RX ORDER — IBUPROFEN 600 MG/1
600 TABLET ORAL 3 TIMES DAILY PRN
Qty: 30 TABLET | Refills: 0 | Status: SHIPPED | OUTPATIENT
Start: 2024-03-04

## 2024-03-04 RX ORDER — OXYCODONE HYDROCHLORIDE 10 MG/1
10 TABLET ORAL EVERY 4 HOURS PRN
Qty: 10 TABLET | Refills: 0 | Status: SHIPPED | OUTPATIENT
Start: 2024-03-04 | End: 2024-03-07

## 2024-03-04 RX ADMIN — POLYETHYLENE GLYCOL 3350 17 G: 17 POWDER, FOR SOLUTION ORAL at 14:29

## 2024-03-04 RX ADMIN — IBUPROFEN 800 MG: 800 TABLET, FILM COATED ORAL at 00:52

## 2024-03-04 RX ADMIN — BISACODYL 5 MG: 5 TABLET, COATED ORAL at 08:09

## 2024-03-04 RX ADMIN — OXYCODONE 10 MG: 5 TABLET ORAL at 12:42

## 2024-03-04 RX ADMIN — ACETAMINOPHEN 1000 MG: 500 TABLET, FILM COATED ORAL at 00:52

## 2024-03-04 RX ADMIN — OXYCODONE 10 MG: 5 TABLET ORAL at 08:31

## 2024-03-04 RX ADMIN — ACETAMINOPHEN 1000 MG: 500 TABLET, FILM COATED ORAL at 08:10

## 2024-03-04 RX ADMIN — HYDROCORTISONE: 25 CREAM TOPICAL at 00:53

## 2024-03-04 RX ADMIN — OXYCODONE 10 MG: 5 TABLET ORAL at 03:57

## 2024-03-04 RX ADMIN — DOCUSATE SODIUM 100 MG: 100 CAPSULE, LIQUID FILLED ORAL at 08:09

## 2024-03-04 RX ADMIN — POLYETHYLENE GLYCOL 3350 17 G: 17 POWDER, FOR SOLUTION ORAL at 08:09

## 2024-03-04 RX ADMIN — Medication 400 MG: at 08:07

## 2024-03-04 RX ADMIN — IBUPROFEN 800 MG: 800 TABLET, FILM COATED ORAL at 11:22

## 2024-03-04 ASSESSMENT — PAIN - FUNCTIONAL ASSESSMENT: PAIN_FUNCTIONAL_ASSESSMENT: ACTIVITIES ARE NOT PREVENTED

## 2024-03-04 ASSESSMENT — PAIN DESCRIPTION - DESCRIPTORS
DESCRIPTORS: SORE
DESCRIPTORS: SORE
DESCRIPTORS: TENDER;SORE
DESCRIPTORS: SORE

## 2024-03-04 ASSESSMENT — PAIN SCALES - GENERAL
PAINLEVEL_OUTOF10: 4
PAINLEVEL_OUTOF10: 7
PAINLEVEL_OUTOF10: 3
PAINLEVEL_OUTOF10: 7
PAINLEVEL_OUTOF10: 4
PAINLEVEL_OUTOF10: 7
PAINLEVEL_OUTOF10: 3
PAINLEVEL_OUTOF10: 8

## 2024-03-04 ASSESSMENT — PAIN DESCRIPTION - LOCATION
LOCATION: ABDOMEN
LOCATION: ABDOMEN
LOCATION: ABDOMEN;INCISION
LOCATION: ABDOMEN
LOCATION: ABDOMEN;INCISION
LOCATION: ABDOMEN
LOCATION: ABDOMEN

## 2024-03-04 ASSESSMENT — PAIN DESCRIPTION - ORIENTATION
ORIENTATION: LOWER
ORIENTATION: ANTERIOR;LOWER

## 2024-03-04 NOTE — CARE COORDINATION
Umbilical drug screen negative.  Yuly Woo ([phone]) with DSS notified.      Copy of umbilical drug screen securely emailed to [email or web address].    Patient and  may discharge home when medically stable.    ZENAIDA Chatterjee-SARAH, PMH-C  [facility]   [phone]

## 2024-03-04 NOTE — PROGRESS NOTES
03/04/24 1350   AVS Reviewed   AVS & discharge instructions reviewed with patient and/or representative? Yes   Reviewed instructions with Patient   Level of Understanding Questions answered;Verbalized understanding

## 2024-03-04 NOTE — PLAN OF CARE
Problem: Postpartum  Goal: Experiences normal postpartum course  Description:  Postpartum OB-Pregnancy care plan goal which identifies if the mother is experiencing a normal postpartum course  Outcome: Progressing  Goal: Appropriate maternal -  bonding  Description:  Postpartum OB-Pregnancy care plan goal which identifies if the mother and  are bonding appropriately  Outcome: Progressing  Goal: Establishment of infant feeding pattern  Description:  Postpartum OB-Pregnancy care plan goal which identifies if the mother is establishing a feeding pattern with their   Outcome: Progressing  Goal: Incisions, wounds, or drain sites healing without S/S of infection  Outcome: Progressing     Problem: Pain  Goal: Verbalizes/displays adequate comfort level or baseline comfort level  Outcome: Progressing     Problem: Infection - Adult  Goal: Absence of infection at discharge  Outcome: Progressing     Problem: Safety - Adult  Goal: Free from fall injury  Outcome: Progressing     Problem: Discharge Planning  Goal: Discharge to home or other facility with appropriate resources  Outcome: Progressing     Problem: Chronic Conditions and Co-morbidities  Goal: Patient's chronic conditions and co-morbidity symptoms are monitored and maintained or improved  Outcome: Progressing     
  Problem: Vaginal Birth or  Section  Goal: Fetal and maternal status remain reassuring during the birth process  Description:  Birth OB-Pregnancy care plan goal which identifies if the fetal and maternal status remain reassuring during the birth process  Outcome: Progressing     Problem: Pain  Goal: Verbalizes/displays adequate comfort level or baseline comfort level  Outcome: Progressing     Problem: Safety - Adult  Goal: Free from fall injury  Outcome: Progressing     
Refer to discharge planning if patient needs post-hospital services based on physician order or complex needs related to functional status, cognitive ability or social support system   Arrange for needed discharge resources and transportation as appropriate   Arrange for interpreters to assist at discharge as needed     Problem: Chronic Conditions and Co-morbidities  Goal: Patient's chronic conditions and co-morbidity symptoms are monitored and maintained or improved  Outcome: Progressing  Flowsheets (Taken 3/1/2024 0700)  Care Plan - Patient's Chronic Conditions and Co-Morbidity Symptoms are Monitored and Maintained or Improved:   Monitor and assess patient's chronic conditions and comorbid symptoms for stability, deterioration, or improvement   Collaborate with multidisciplinary team to address chronic and comorbid conditions and prevent exacerbation or deterioration   Update acute care plan with appropriate goals if chronic or comorbid symptoms are exacerbated and prevent overall improvement and discharge     Problem: Postpartum  Goal: Experiences normal postpartum course  Description:  Postpartum OB-Pregnancy care plan goal which identifies if the mother is experiencing a normal postpartum course  Outcome: Progressing  Flowsheets (Taken 3/1/2024 0915)  Experiences Normal Postpartum Course:   Monitor maternal vital signs   Assess uterine involution  Goal: Appropriate maternal -  bonding  Description:  Postpartum OB-Pregnancy care plan goal which identifies if the mother and  are bonding appropriately  Outcome: Progressing  Goal: Establishment of infant feeding pattern  Description:  Postpartum OB-Pregnancy care plan goal which identifies if the mother is establishing a feeding pattern with their   Outcome: Progressing  Flowsheets (Taken 3/1/2024 0915)  Establishment of Infant Feeding Pattern:   Assess breast/bottle feeding   Refer to lactation as needed  Goal: Incisions, wounds, or drain

## 2024-03-04 NOTE — PROGRESS NOTES
Patient discharged to home per MD orders.  Discharge instructions reviewed with patient  by Liza NIETO RN. Questions encouraged and answered. Patient verbalizes understanding. Patient escorted by [facility] staff to private vehicle. Stable at discharge.
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
## 2024-03-04 NOTE — DISCHARGE INSTRUCTIONS
You have a seizure.   Where to get help 24 hours a day, 7 days a week   If you or someone you know talks about suicide, self-harm, a mental health crisis, a substance use crisis, or any other kind of emotional distress, get help right away. You can:    Call the Suicide and Crisis Lifeline at 988.     Call 7-974-098-TALK (1-113.686.6496).     Text HOME to 871701 to access the Crisis Text Line.   Consider saving these numbers in your phone.  Go to [employer] for more information or to chat online.  Call your doctor now or seek immediate medical care if:    You have loose stitches, or your incision comes open.     You have signs of hemorrhage (too much bleeding), such as:  Heavy vaginal bleeding. This means that you are soaking through one or more pads in an hour. Or you pass blood clots bigger than an egg.  Feeling dizzy or lightheaded, or you feel like you may faint.  Feeling so tired or weak that you cannot do your usual activities.  A fast or irregular heartbeat.  New or worse belly pain.     You have symptoms of infection, such as:  Increased pain, swelling, warmth, or redness.  Red streaks leading from the incision.  Pus draining from the incision.  A fever.  Frequent or painful urination or blood in your urine.  Vaginal discharge that smells bad.  New or worse belly pain.     You have symptoms of a blood clot in your leg (called a deep vein thrombosis), such as:  Pain in the calf, back of the knee, thigh, or groin.  Swelling in the leg or groin.  A color change on the leg or groin. The skin may be reddish or purplish, depending on your usual skin color.     You have signs of preeclampsia, such as:  Sudden swelling of your face, hands, or feet.  New vision problems (such as dimness, blurring, or seeing spots).  A severe headache.     You have signs of heart failure, such as:  New or increased shortness of breath.  New or worse swelling in your legs, ankles, or feet.  Sudden weight gain, such as more than 2

## 2024-03-04 NOTE — DISCHARGE SUMMARY
Obstetric Discharge Summary    Admitting Diagnosis  Patient admitted with pregnancy at 39w6d for scheduled RCS. Pregnancy is complicated by hx of CS x2, severe constipation with admission this pregnancy, and + UDS (opiates and amphetamines). Pregnancy is also complicated by JANET. She did receive IV iron during admission. Recent UDS negative. She had an uncomplicated delivery and postpartum course  OB History          6    Para   5    Term   5            AB   1    Living   5         SAB   1    IAB        Ectopic        Molar        Multiple   0    Live Births   5                Reasons for Admission on 3/1/2024  5:21 AM   delivery delivered [O82]  No comment available   Section (Repeat)    Prenatal Procedures  None    Intrapartum Procedures        Multiple birth?: No         Delivery: : Low Cervical, Transverse and See Labor and Delivery Summary       Postpartum Procedures  None    Postpartum/Operative Complications       [employer]  Information for the patient's :  Juan Pablo Velazco [[id number]]   male   Birth Weight: 4.02 kg (8 lb 13.8 oz)   Discharge With Mother  Complications: No    Discharge Diagnosis       Discharge Information  Current Discharge Medication List        START taking these medications    Details   oxyCODONE (OXY-IR) 10 MG immediate release tablet Take 1 tablet by mouth every 4 hours as needed for Pain for up to 3 days. Max Daily Amount: 60 mg  Qty: 10 tablet, Refills: 0    Comments: Reduce doses taken as pain becomes manageable  Associated Diagnoses:  delivery delivered      ibuprofen (ADVIL;MOTRIN) 600 MG tablet Take 1 tablet by mouth 3 times daily as needed for Pain  Qty: 30 tablet, Refills: 0           CONTINUE these medications which have CHANGED    Details   docusate sodium (COLACE) 100 MG capsule Take 1 capsule by mouth daily as needed for Constipation  Qty: 30 capsule, Refills: 0           CONTINUE these medications which have

## 2024-03-21 ENCOUNTER — POSTPARTUM VISIT (OUTPATIENT)
Dept: OBGYN CLINIC | Age: 29
End: 2024-03-21
Payer: COMMERCIAL

## 2024-03-21 VITALS
DIASTOLIC BLOOD PRESSURE: 78 MMHG | BODY MASS INDEX: 29.19 KG/M2 | HEIGHT: 67 IN | SYSTOLIC BLOOD PRESSURE: 114 MMHG | WEIGHT: 186 LBS

## 2024-03-21 PROBLEM — Z67.91 RH NEGATIVE STATUS DURING PREGNANCY IN THIRD TRIMESTER: Status: RESOLVED | Noted: 2020-08-26 | Resolved: 2024-03-21

## 2024-03-21 PROBLEM — O99.343 DEPRESSION AFFECTING PREGNANCY IN THIRD TRIMESTER, ANTEPARTUM: Status: RESOLVED | Noted: 2020-08-26 | Resolved: 2024-03-21

## 2024-03-21 PROBLEM — Z14.1 CYSTIC FIBROSIS CARRIER: Status: RESOLVED | Noted: 2023-08-03 | Resolved: 2024-03-21

## 2024-03-21 PROBLEM — Z91.51 HISTORY OF SUICIDE ATTEMPT: Status: RESOLVED | Noted: 2023-08-03 | Resolved: 2024-03-21

## 2024-03-21 PROBLEM — O09.93 SUPERVISION OF HIGH RISK PREGNANCY IN THIRD TRIMESTER: Status: RESOLVED | Noted: 2023-08-03 | Resolved: 2024-03-21

## 2024-03-21 PROBLEM — Z83.49: Status: RESOLVED | Noted: 2023-08-24 | Resolved: 2024-03-21

## 2024-03-21 PROBLEM — O26.893 RH NEGATIVE STATUS DURING PREGNANCY IN THIRD TRIMESTER: Status: RESOLVED | Noted: 2020-08-26 | Resolved: 2024-03-21

## 2024-03-21 PROBLEM — F32.A DEPRESSION AFFECTING PREGNANCY IN THIRD TRIMESTER, ANTEPARTUM: Status: RESOLVED | Noted: 2020-08-26 | Resolved: 2024-03-21

## 2024-03-21 NOTE — PATIENT INSTRUCTIONS
Patient Education        Nutrition for Breastfeeding: Care Instructions  Overview     Eating well during breastfeeding helps you stay healthy. Eat a variety of grains, vegetables, fruits, dairy or dairy alternatives, and protein foods. Avoid fish high in mercury. And limit alcohol and caffeine. Your doctor or midwife may suggest eating more calories each day than otherwise recommended for a person of your height and weight.  Follow-up care is a key part of your treatment and safety. Be sure to make and go to all appointments, and call your doctor if you are having problems. It's also a good idea to know your test results and keep a list of the medicines you take.  How can you care for yourself at home?  Making good choices about what you eat and drink when you are breastfeeding can help you stay healthy. It can also help your baby stay healthy. Here are some things you can do.  Eat a variety of healthy foods.  This includes vegetables, fruits, milk products, whole grains, and protein.  Drink plenty of fluids.  Make water your first choice. If you have kidney, heart, or liver disease and have to limit fluids, talk with your doctor before you increase your fluids.  Avoid fish with high mercury.  This includes shark, swordfish, mp mackerel, and marlin. It also includes orange roughy, bigeye tuna, and tilefish from the [place]. Instead, eat fish that is low in mercury. Choose canned light tuna, salmon, pollock, catfish, or shrimp.   Limit caffeine.  Things like coffee, tea, chocolate, and some sodas can contain caffeine. Caffeine can pass through breast milk to your baby. It may cause fussiness and sleep problems. Talk to your doctor about how much caffeine is safe for you.  Limit alcohol.  Alcohol can pass through breast milk to your baby. Talk to your doctor if you have questions about drinking alcohol while breastfeeding.  Be safe with supplements.  Talk with your doctor before taking any vitamins, minerals,

## 2024-03-21 NOTE — PROGRESS NOTES
CC:  C/S postop follow-up    HPI:  Kelsi presents for postop visit from  about 2 weeks ago.  Patient doing well postpartum without significant complaints. Voiding without difficulty. Pain controlled on no medications at this point other than PRN Ibuprofen.  Light spotting only.  +BM/Flatus.  Denies n/v, tolerating regular diet.     Denies any si/sx pp depression.  Reports her mood is good. Good support.     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 No distress.   HENT:    Head: Normocephalic and atraumatic.   Lungs: CTAB, effort normal  Cardiovascular: RRR, no M/R/G  Abd:  Fundus firm and well below umbilicus, S/NTTP/ND, BS normoactive.  Incision c/d/i w/out erythema/induration   Skin: She is not diaphoretic.   Psychiatric: She has a normal mood and affect. Her behavior is normal. Thought content normal.         A/P:  Stable Post op condition.  Gradually increase activity; continue pelvic rest and keep lifting to <15lbs until 6wks pp.  Follow up  4 weeks for routine 6wk pp apt   Re-enforced she should not lifting > 15#s for another 4 weeks  Denies any dx of postpartum depression today   Info given for breastfeeding support

## 2024-03-29 ENCOUNTER — PATIENT MESSAGE (OUTPATIENT)
Dept: OBGYN CLINIC | Age: 29
End: 2024-03-29

## 2024-03-29 DIAGNOSIS — B37.9 YEAST INFECTION: Primary | ICD-10-CM

## 2024-03-29 RX ORDER — FLUCONAZOLE 150 MG/1
150 TABLET ORAL
Qty: 2 TABLET | Refills: 0 | Status: SHIPPED | OUTPATIENT
Start: 2024-03-29 | End: 2024-04-04

## 2024-03-29 NOTE — TELEPHONE ENCOUNTER
From: Kelsi Velazco  To: Dr. Katie Mcarthur  Sent: 3/29/2024 7:33 AM EDT  Subject: Yeast infection    How are you? This past few days I have been real itchy in my vagina area, like the inside of my vagina is itchy and I have more than usual discharge. I'm sure it's a yeast infection I have had them before. Is there any way you could call me in some Diflucan or allow me to be seen today. Thanks

## 2024-04-18 ENCOUNTER — POSTPARTUM VISIT (OUTPATIENT)
Dept: OBGYN CLINIC | Age: 29
End: 2024-04-18
Payer: COMMERCIAL

## 2024-04-18 VITALS
DIASTOLIC BLOOD PRESSURE: 70 MMHG | SYSTOLIC BLOOD PRESSURE: 114 MMHG | BODY MASS INDEX: 29.19 KG/M2 | HEIGHT: 67 IN | WEIGHT: 186 LBS

## 2024-04-18 DIAGNOSIS — M62.838 LEVATOR SPASM: Primary | ICD-10-CM

## 2024-04-18 PROCEDURE — 99213 OFFICE O/P EST LOW 20 MIN: CPT | Performed by: OBSTETRICS & GYNECOLOGY

## 2024-04-18 PROCEDURE — 99024 POSTOP FOLLOW-UP VISIT: CPT | Performed by: OBSTETRICS & GYNECOLOGY

## 2024-04-18 RX ORDER — VENLAFAXINE HYDROCHLORIDE 37.5 MG/1
37.5 CAPSULE, EXTENDED RELEASE ORAL DAILY
Qty: 30 CAPSULE | Refills: 2 | Status: SHIPPED | OUTPATIENT
Start: 2024-04-18

## 2024-04-18 RX ORDER — NORETHINDRONE ACETATE AND ETHINYL ESTRADIOL 1MG-20(21)
1 KIT ORAL DAILY
Qty: 3 PACKET | Refills: 4 | Status: SHIPPED | OUTPATIENT
Start: 2024-04-18

## 2024-04-18 NOTE — PROGRESS NOTES
Discussed it will take several weeks before she starts to see any significant difference in her mood from the antidepressant that we are starting today.  Pt instructed that although we are prescribing her a medication today, she needs to follow-up ASAP with a psychiatrist in order to have an expert in the field who can follow her closely manage her symptoms.  Referral placed today.  Pt and  both voice understanding.        Orders Placed This Encounter   Medications    norethindrone-ethinyl estradiol (LOESTRIN FE 1/20) 1-20 MG-MCG per tablet     Sig: Take 1 tablet by mouth daily     Dispense:  3 packet     Refill:  4    venlafaxine (EFFEXOR XR) 37.5 MG extended release capsule     Sig: Take 1 capsule by mouth daily     Dispense:  30 capsule     Refill:  2        Katie Mcarthur DO   2:11 PM  04/18/24

## 2024-05-29 ENCOUNTER — HOSPITAL ENCOUNTER (EMERGENCY)
Age: 29
Discharge: LEFT BEFORE DISCHARGE | End: 2024-05-30
Attending: STUDENT IN AN ORGANIZED HEALTH CARE EDUCATION/TRAINING PROGRAM
Payer: COMMERCIAL

## 2024-05-29 VITALS
WEIGHT: 175 LBS | HEIGHT: 67 IN | SYSTOLIC BLOOD PRESSURE: 137 MMHG | OXYGEN SATURATION: 100 % | HEART RATE: 89 BPM | BODY MASS INDEX: 27.47 KG/M2 | TEMPERATURE: 98.6 F | DIASTOLIC BLOOD PRESSURE: 89 MMHG | RESPIRATION RATE: 19 BRPM

## 2024-05-29 DIAGNOSIS — Z53.21 ELOPED FROM EMERGENCY DEPARTMENT: Primary | ICD-10-CM

## 2024-05-29 PROCEDURE — 99283 EMERGENCY DEPT VISIT LOW MDM: CPT

## 2024-05-29 RX ORDER — TETRACAINE HYDROCHLORIDE 5 MG/ML
2 SOLUTION OPHTHALMIC
Status: COMPLETED | OUTPATIENT
Start: 2024-05-30 | End: 2024-05-30

## 2024-05-29 ASSESSMENT — PAIN - FUNCTIONAL ASSESSMENT: PAIN_FUNCTIONAL_ASSESSMENT: 0-10

## 2024-05-29 ASSESSMENT — PAIN SCALES - GENERAL: PAINLEVEL_OUTOF10: 8

## 2024-05-30 PROCEDURE — 6370000000 HC RX 637 (ALT 250 FOR IP): Performed by: STUDENT IN AN ORGANIZED HEALTH CARE EDUCATION/TRAINING PROGRAM

## 2024-05-30 RX ORDER — ACETAMINOPHEN 500 MG
1000 TABLET ORAL
Status: DISCONTINUED | OUTPATIENT
Start: 2024-05-30 | End: 2024-05-30 | Stop reason: HOSPADM

## 2024-05-30 RX ADMIN — TETRACAINE HYDROCHLORIDE 2 DROP: 5 SOLUTION OPHTHALMIC at 00:05

## 2024-05-30 ASSESSMENT — PAIN DESCRIPTION - LOCATION: LOCATION: HEAD

## 2024-05-30 ASSESSMENT — PAIN SCALES - GENERAL: PAINLEVEL_OUTOF10: 10
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
## 2025-03-21 ENCOUNTER — TELEPHONE (OUTPATIENT)
Dept: OBGYN CLINIC | Age: 30
End: 2025-03-21

## 2025-03-21 NOTE — TELEPHONE ENCOUNTER
LVM asking patient for return call.  Patient called front office asking for pregnancy appointment. LMP 11/4. Patient was scheduled for OB education appointment in January however no showed and did not return calls or get rescheduled.

## 2025-03-21 NOTE — TELEPHONE ENCOUNTER
Patient returned call. Discussed with patient that this is considered late prenatal care and we recommend she reach out to either [place] or [facility] for care. Line either disconnected or patient hung up.

## (undated) DEVICE — TUBING, SUCTION, 1/4" X 10', STRAIGHT: Brand: MEDLINE

## (undated) DEVICE — TRAY PREP DRY W/ PREM GLV 2 APPL 6 SPNG 2 UNDPD 1 OVERWRAP

## (undated) DEVICE — SUTURE VCRL SZ 1 L27IN ABSRB UD CT-1 L36MM 1/2 CIR J261H

## (undated) DEVICE — Device: Brand: PORTEX

## (undated) DEVICE — AMD ANTIMICROBIAL NON-ADHERENT PAD,0.2% POLYHEXAMETHYLENE BIGUANIDE HCI (PHMB): Brand: TELFA

## (undated) DEVICE — SURGICAL PROCEDURE PACK C SECT CDS

## (undated) DEVICE — TRAY CATH 16FR PVC INTMIT STR TIP PREATTACH TO 1000ML COLL

## (undated) DEVICE — PENCIL ES L3M BTTN SWCH S STL HEX LOK BLDE ELECTRD HOLSTER

## (undated) DEVICE — KENDALL SCD EXPRESS SLEEVES, KNEE LENGTH, MEDIUM: Brand: KENDALL SCD

## (undated) DEVICE — SUTURE PDS II SZ 0 L27IN ABSRB VLT L36MM CT-1 1/2 CIR Z340H

## (undated) DEVICE — ELECTRODE PT RET AD L9FT HI MOIST COND ADH HYDRGEL CORDED

## (undated) DEVICE — AMD ANTIMICROBIAL GAUZE SPONGES,12 PLY USP TYPE VII, 0.2% POLYHEXAMETHYLENE BIGUANIDE HCI (PHMB): Brand: CURITY

## (undated) DEVICE — SUTURE MCRYL SZ 4-0 L27IN ABSRB UD L19MM PS-2 1/2 CIR PRIM Y426H

## (undated) DEVICE — SUTURE VCRL SZ 0 L36IN ABSRB UD L36MM CT-1 1/2 CIR J946H

## (undated) DEVICE — AGENT HEMSTAT 3GM OXIDIZED REGENERATED CELOS ABSRB FOR CONT (ORDER MULTIPLES OF 5EA)